# Patient Record
Sex: FEMALE | Race: OTHER | Employment: OTHER | URBAN - METROPOLITAN AREA
[De-identification: names, ages, dates, MRNs, and addresses within clinical notes are randomized per-mention and may not be internally consistent; named-entity substitution may affect disease eponyms.]

---

## 2020-06-04 ENCOUNTER — OFFICE VISIT (OUTPATIENT)
Dept: PRIMARY CARE CLINIC | Age: 70
End: 2020-06-04

## 2020-06-04 PROCEDURE — 99211 OFF/OP EST MAY X REQ PHY/QHP: CPT | Performed by: NURSE PRACTITIONER

## 2020-06-05 LAB
SARS-COV-2: NOT DETECTED
SOURCE: NORMAL

## 2022-08-31 ENCOUNTER — OFFICE VISIT (OUTPATIENT)
Dept: PULMONOLOGY | Age: 72
End: 2022-08-31
Payer: MEDICARE

## 2022-08-31 VITALS
SYSTOLIC BLOOD PRESSURE: 148 MMHG | BODY MASS INDEX: 29.37 KG/M2 | OXYGEN SATURATION: 98 % | HEIGHT: 64 IN | HEART RATE: 95 BPM | WEIGHT: 172 LBS | DIASTOLIC BLOOD PRESSURE: 90 MMHG

## 2022-08-31 DIAGNOSIS — J45.991 COUGH VARIANT ASTHMA: Primary | ICD-10-CM

## 2022-08-31 DIAGNOSIS — J47.9 BRONCHIECTASIS WITHOUT COMPLICATION (HCC): ICD-10-CM

## 2022-08-31 DIAGNOSIS — J30.1 SEASONAL ALLERGIC RHINITIS DUE TO POLLEN: ICD-10-CM

## 2022-08-31 DIAGNOSIS — R91.8 PULMONARY NODULES: ICD-10-CM

## 2022-08-31 PROCEDURE — 1123F ACP DISCUSS/DSCN MKR DOCD: CPT | Performed by: INTERNAL MEDICINE

## 2022-08-31 PROCEDURE — 99204 OFFICE O/P NEW MOD 45 MIN: CPT | Performed by: INTERNAL MEDICINE

## 2022-08-31 RX ORDER — MONTELUKAST SODIUM 10 MG/1
10 TABLET ORAL NIGHTLY
COMMUNITY
Start: 2021-10-06 | End: 2022-08-31 | Stop reason: SDUPTHER

## 2022-08-31 RX ORDER — ALBUTEROL SULFATE 90 UG/1
2 AEROSOL, METERED RESPIRATORY (INHALATION) EVERY 6 HOURS PRN
COMMUNITY
Start: 2022-02-09

## 2022-08-31 RX ORDER — HYDROCHLOROTHIAZIDE 12.5 MG/1
12.5 CAPSULE, GELATIN COATED ORAL DAILY
COMMUNITY
Start: 2022-07-11

## 2022-08-31 RX ORDER — ALENDRONATE SODIUM 70 MG/1
70 TABLET ORAL
COMMUNITY

## 2022-08-31 RX ORDER — MONTELUKAST SODIUM 10 MG/1
10 TABLET ORAL NIGHTLY
Qty: 30 TABLET | Refills: 3 | Status: SHIPPED | OUTPATIENT
Start: 2022-08-31 | End: 2022-11-29

## 2022-08-31 RX ORDER — CYANOCOBALAMIN (VITAMIN B-12) 1000 MCG
1 TABLET, EXTENDED RELEASE ORAL 2 TIMES DAILY WITH MEALS
COMMUNITY

## 2022-08-31 RX ORDER — OMEPRAZOLE 20 MG/1
20 CAPSULE, DELAYED RELEASE ORAL
COMMUNITY

## 2022-08-31 RX ORDER — AMLODIPINE BESYLATE 5 MG/1
5 TABLET ORAL EVERY EVENING
COMMUNITY
Start: 2022-07-11

## 2022-08-31 RX ORDER — CLONAZEPAM 0.5 MG/1
0.5 TABLET ORAL 2 TIMES DAILY PRN
COMMUNITY
Start: 2022-03-15

## 2022-08-31 RX ORDER — PRAVASTATIN SODIUM 20 MG
20 TABLET ORAL DAILY
COMMUNITY
Start: 2021-11-01

## 2022-08-31 RX ORDER — NAPROXEN 500 MG/1
500 TABLET ORAL PRN
COMMUNITY

## 2022-08-31 RX ORDER — FOLIC ACID 1 MG/1
1 TABLET ORAL 2 TIMES DAILY
COMMUNITY

## 2022-08-31 RX ORDER — KRILL/OM-3/DHA/EPA/PHOSPHO/AST 500-110 MG
1 CAPSULE ORAL
COMMUNITY

## 2022-08-31 RX ORDER — AZELASTINE 1 MG/ML
1 SPRAY, METERED NASAL 2 TIMES DAILY
COMMUNITY

## 2022-08-31 RX ORDER — MOMETASONE FUROATE AND FORMOTEROL FUMARATE DIHYDRATE 200; 5 UG/1; UG/1
2 AEROSOL RESPIRATORY (INHALATION) DAILY
COMMUNITY
Start: 2022-06-28

## 2022-08-31 NOTE — PROGRESS NOTES
PULMONARY OFFICE NEW PATIENT VISIT    CONSULTING PHYSICIAN:  Macho Camacho , Vanessa ref. provider found     REASON FOR VISIT:   Chief Complaint   Patient presents with    New Patient     Lung nodules     Asthma       DATE OF VISIT: 8/31/2022    HISTORY OF PRESENT ILLNESS: 67y.o. year old female comes into the pulmonary clinic for the first time. Patient has been diagnosed to have cough variant asthma since 2020. Simultaneously has also been suffering with postnasal drip, environmental allergies. Has been receiving treatment at the [facility].  Recently has plan to move all her medical care closer to home at [facility]. In the past was started on Dulera inhaler but developed thrush and for a few weeks could not use the inhaler. Once thrush was treated with oral fluconazole, eventually patient was able to restart [facility] and this time no thrush reoccurred. Has been using the inhaler regularly. Rarely uses albuterol. Also takes Astelin nasal spray along with Singulair. Also undergoing sublingual immunotherapy through ENT. Has been diagnosed to have bilateral bronchiectasis and currently using Acapella device. Takes methotrexate for psoriasis. REVIEW OF SYSTEMS:   CONSTITUTIONAL SYMPTOMS: The patient denies fever, fatigue, night sweats, weight loss or weight gain. HEENT: No vision changes. No tinnitus, Denies sinus pain. No hoarseness, or dysphagia. NECK: Patient denies swelling in the neck. CARDIOVASCULAR: Denies chest pain, palpitation, syncope. RESPIRATORY: As per HPI  GASTROINTESTINAL: Denies nausea, abdominal pain or change in bowel function. GENITOURINARY: Denies obstructive symptoms. No history of incontinence. BREASTS: No masses or lumps in the breasts. SKIN: No rashes or itching. MUSCULOSKELETAL: Denies weakness or bone pain. NEUROLOGICAL: No headaches or seizures. PSYCHIATRIC: Denies mood swings or depression.    ENDOCRINE: Denies heat or cold intolerance or excessive thirst.  HEMATOLOGIC/LYMPHATIC: Denies easy bruising or lymph node swelling. ALLERGIC/IMMUNOLOGIC: No environmental allergies. PAST MEDICAL HISTORY:   Past Medical History:   Diagnosis Date    Asthma     Hypertension     Lung nodule     Rheumatoid arthritis ([address]        PAST SURGICAL HISTORY: No past surgical history on file. SOCIAL HISTORY:   Social History     Tobacco Use    Smoking status: Never    Smokeless tobacco: Never   Substance Use Topics    Alcohol use: Not Currently    Drug use: Never       FAMILY HISTORY: No family history on file. MEDICATIONS:     Current Outpatient Medications on File Prior to Visit   Medication Sig Dispense Refill    albuterol sulfate HFA (PROVENTIL;VENTOLIN;PROAIR) 108 (90 Base) MCG/ACT inhaler Inhale 2 puffs into the lungs every 6 hours as needed      alendronate (FOSAMAX) 70 MG tablet Take 70 mg by mouth every 7 days      amLODIPine (NORVASC) 5 MG tablet Take 5 mg by mouth every evening      azelastine (ASTELIN) 0.1 % nasal spray 1 spray by Nasal route 2 times daily      diclofenac sodium (VOLTAREN) 1 % GEL Apply topically 4 times daily      DULERA 200-5 MCG/ACT inhaler 2 puffs daily      Krill Oil (OMEGA-3) 500 MG CAPS Take 1 capsule by mouth      methotrexate (RHEUMATREX) 2.5 MG chemo tablet Take by mouth      omeprazole (PRILOSEC) 20 MG delayed release capsule Take 20 mg by mouth      pravastatin (PRAVACHOL) 20 MG tablet Take 20 mg by mouth daily      naproxen (NAPROSYN) 500 MG tablet Take 500 mg by mouth as needed      folic acid (FOLVITE) 1 MG tablet Take 1 mg by mouth 2 times daily      hydroCHLOROthiazide (MICROZIDE) 12.5 MG capsule Take 12.5 mg by mouth daily      clonazePAM (KLONOPIN) 0.5 MG tablet Take 0.5 mg by mouth 2 times daily as needed.       calcium citrate-vitamin D (CITRICAL + D) 315-250 MG-UNIT TABS per tablet Take 1 tablet by mouth 2 times daily (with meals)      LEUCOVORIN CALCIUM PO Take 2 tablets by mouth once a week       No current facility-administered medications on file prior to visit. ALLERGIES:   Allergies as of 08/31/2022 - Fully Reviewed 08/31/2022   Allergen Reaction Noted    Erythromycin Nausea And Vomiting and Rash 01/25/2011    Penicillins Rash 11/21/2013      OBJECTIVE:   height is 5' 4\" (1.626 m) and weight is 172 lb (78 kg). Her blood pressure is 148/90 (abnormal) and her pulse is 95. Her oxygen saturation is 98%. PHYSICAL EXAM:    CONSTITUTIONAL: She is a 67y.o.-year-old who appears her stated age. She is alert and oriented x 3 and in no acute distress. HEENT: PERRLA, EOMI. No scleral icterus. No thrush, atraumatic, normocephalic. NECK: Supple, without cervical or supraclavicular lymphadenopathy:  CARDIOVASCULAR: S1 S2 RRR. Without murmer  RESPIRATORY & CHEST: Lungs are clear to auscultation and percussion. No wheezing, no crackles. Good air movement  GASTROINTESTINAL & ABDOMEN: Soft, nontender, positive bowel sounds in all quadrants, non-distended, without hepatosplenomegaly. GENITOURINARY: Deferred. MUSCULOSKELETAL: No tenderness to palpation of the axial skeleton. There is no clubbing. No cyanosis. No edema of the lower extremities. SKIN OF BODY: No rash or jaundice. PSYCHIATRIC EVALUATION: Normal affect. Patient answers questions appropriately. HEMATOLOGIC/LYMPHATIC/ IMMUNOLOGIC: No palpable lymphadenopathy. NEUROLOGIC: Alert and oriented x 3. Groslly non-focal. Motor strength is 5+/5 in all muscle groups. The patient has a normal sensorium globally. LABS:    No flowsheet data found. IMAGING:    CT chest without contrast done on 7/22/2022 at the [facility]    Findings:  Lungs and pleura: Mild apical subpleural fibrosis. There is read demonstration of a bilobed or pleural effusion nodules in the superior segment of the right lower lobe.   Measure to greater than nodules, and measures 7 x 5 mm, previously measured seen level and in the same fashion then measured seen on August 14, 2020. Nodules appear slightly more confluent and are not clearly separable as a result are measured together on the current exam.  There is mild mucous plugging with branching linear and nodular opacity in the lateral segment of the middle lobe compatible with small focus of bronchiolitis. Tiny nodule in the superior segment left lower lobe images remained stable. Additional mucous plugging identified in the left lower lobe. No pleural effusion. Normal pulmonary nodule otherwise identified. Normal heart size. No pericardial effusion, no mediastinal lymphadenopathy, no hilar lymphadenopathy.,  No axillary lymphadenopathy, no supraclavicular lymphadenopathy. Multiple hepatic cysts not appreciably changed in comparison to the prior exam.  No acute upper abdominal abnormality. Pulmonary Functions Testing Results:    Complete PFT done on 9/21/2020 at [facility] is diminished suggestive of possible restrictive defect. BRONCHIAL CHALLENGE TEST   Challenge Test is a positive result. The PD 20 is 16 mg/ml. Electronically Signed On 9- 21:08:45 EDT by Lorena Green MD      1. Cough variant asthma/small airways disease-- noted + methacholine challenge-- well controlled now  2. Pulmonary infiltrates RLL tree in bud 1/29/20- suspected bacterial PNA-- s/p omnicef/doxycycline-- resolved  3. BLL bronchiectasis- mild  4. RLL 5 x 3 mm lung nodule- stable since 10/2019  5. RLL 2-3 mm lung nodule- stable since 10/2019  6. Severe thrush- failed multiple treatments  7. Hx sinusitis with B osteomeatal obstruction  8. Post nasal drainage- resolved  9. RA- on MTX  10. Psoriasis- resolved s/p tar treatments  11. Allergies- on sl immunotherapy  12. Osteoporosis- on fosamax  13. Anxiety  14. HPL  15. GERD- on prilosec PRN  16. Hx BCG administration in [place] as child  16. Normal IgE and eosinophil level  18. Normal immunoglobulins              Plan:   1.  Off dulera 200/5 mcg 2p BID d/t thrush  2. Cont singulair 10 mg qhs  3. Albuterol HFA/HHN PRN-- not needed to use  4. Cont astelin NS, saline rinses per ENT  5. Cont acapella QID  6. PEF ~ 330 ml  7. Cont tessalon PRN  8. Cont saline NS daily  9. Cont immunotherapy per ENT  10. Cont pulm rehab @ [place]  11. CT chest 5/2022   12. Sees Dr Francisco Duong with ID tomorrow to address persistent thrush  13. RTC 8 months          IMPRESSION AND RECOMMENDATIONS:     1. Cough variant asthma  -Patient has been diagnosed to have cough variant asthma based on her episodic symptoms as well as positive methacholine challenge test.  -Continue with Dulera at 200/5 mcg 2 puff twice daily. As her symptoms stabilize, should be able to downgrade her to lower dose of Dulera. -Use albuterol as needed.  -I advised the patient to use both these inhalers with a spacer chamber to reduce the risk of recurrence of thrush.  -Also using Acapella device bilateral bronchiectasis. 2. Pulmonary nodules  -Patient has had both right lower lobe as well as left lower lobe subcentimeter pulm nodules which have been present since 2019.  -She is a lifelong non-smoker with no previous history of cancers.  -High risk for lung malignancy is low.  -At this time I do not see any indication for repeat chest imaging. 3. Seasonal allergic rhinitis due to pollen  -Continue taking montelukast and Astelin nasal spray. Also undergoing sublingual immunotherapy via ENT. 4. Bronchiectasis without complication (HCC)  -Currently not in exacerbation.  -She will use Acapella device regularly in order to help her expectorate. -Advised the patient to use albuterol first thing in the morning and then try Acapella device in order to help her expectorate more effectively. - Acapella; Future    Thank you  Dr. Terrie Watts for letting me take care of this very pleasant patient. Return in about 6 months (around 2/28/2023) for Asthma.          Jerry Henry MD  Pulmonary Critical Care and Sleep Medicine  8/31/2022, 12:34 PM    This note was completed using dragon medical speech recognition software. Grammatical errors, random word insertions, pronoun errors and incomplete sentences are occasional consequences of this technology due to software limitations. If there are questions or concerns about the content of this note of information contained within the body of this dictation they should be addressed with the provider for clarification.

## 2022-09-14 ENCOUNTER — TELEPHONE (OUTPATIENT)
Dept: PULMONOLOGY | Age: 72
End: 2022-09-14

## 2022-09-14 NOTE — TELEPHONE ENCOUNTER
Pt called and left voice mail saying her cough has not gotten any better and would like to discuss.      # [phone]

## 2022-09-14 NOTE — TELEPHONE ENCOUNTER
Spoke with patient. She states her cough starts to get worse in the afternoon. Recommended to pt that she can take the Albuterol every 6 hours as needed. She is taking the Dulera, Singulair and Flonase daily. She is out of the Astelin NS. I advised pt to get Astelin refilled and start using it again.

## 2022-11-30 ENCOUNTER — OFFICE VISIT (OUTPATIENT)
Dept: PULMONOLOGY | Age: 72
End: 2022-11-30
Payer: MEDICARE

## 2022-11-30 VITALS
DIASTOLIC BLOOD PRESSURE: 86 MMHG | WEIGHT: 166.4 LBS | SYSTOLIC BLOOD PRESSURE: 138 MMHG | OXYGEN SATURATION: 98 % | BODY MASS INDEX: 28.41 KG/M2 | HEART RATE: 106 BPM | HEIGHT: 64 IN

## 2022-11-30 DIAGNOSIS — J47.9 BRONCHIECTASIS WITHOUT COMPLICATION (HCC): ICD-10-CM

## 2022-11-30 DIAGNOSIS — R91.8 PULMONARY NODULES: ICD-10-CM

## 2022-11-30 DIAGNOSIS — J30.1 NON-SEASONAL ALLERGIC RHINITIS DUE TO POLLEN: ICD-10-CM

## 2022-11-30 DIAGNOSIS — J45.991 COUGH VARIANT ASTHMA: Primary | ICD-10-CM

## 2022-11-30 PROCEDURE — 99214 OFFICE O/P EST MOD 30 MIN: CPT | Performed by: INTERNAL MEDICINE

## 2022-11-30 PROCEDURE — 1123F ACP DISCUSS/DSCN MKR DOCD: CPT | Performed by: INTERNAL MEDICINE

## 2022-11-30 RX ORDER — GUAIFENESIN 600 MG/1
600 TABLET, EXTENDED RELEASE ORAL 2 TIMES DAILY
Qty: 10 TABLET | Refills: 0 | Status: SHIPPED | OUTPATIENT
Start: 2022-11-30 | End: 2022-12-05

## 2022-11-30 RX ORDER — MOMETASONE FUROATE AND FORMOTEROL FUMARATE DIHYDRATE 200; 5 UG/1; UG/1
2 AEROSOL RESPIRATORY (INHALATION) 2 TIMES DAILY
Qty: 13 G | Refills: 5 | Status: SHIPPED | OUTPATIENT
Start: 2022-11-30

## 2022-11-30 RX ORDER — ALBUTEROL SULFATE 90 UG/1
2 AEROSOL, METERED RESPIRATORY (INHALATION) EVERY 6 HOURS PRN
Qty: 18 G | Refills: 5 | Status: SHIPPED | OUTPATIENT
Start: 2022-11-30

## 2022-11-30 RX ORDER — MONTELUKAST SODIUM 10 MG/1
10 TABLET ORAL NIGHTLY
Qty: 90 TABLET | Refills: 3 | Status: SHIPPED | OUTPATIENT
Start: 2022-11-30 | End: 2023-11-25

## 2022-11-30 NOTE — PROGRESS NOTES
PULMONARY OFFICE FOLLOW-UP VISIT    CONSULTING PHYSICIAN:  Natalya Joy    REASON FOR VISIT:   Chief Complaint   Patient presents with    Cough         DATE OF VISIT: 11/30/2022    HISTORY OF PRESENT ILLNESS: 67y.o. year old female comes into the pulmonary clinic for a follow-up. Patient reports that her breathing is about the same. She has been under a lot of stress as her  is currently in nursing home due to delirium. This has taken a toll on her health as she feels severely anxious. Anxiety leads to increased shortness of breath and occasionally cough. She reports the cough is very situational and does happen in high stress environment. She has been finding it tough to expectorate. Has been using Acapella device regularly. Also using Dulera and albuterol regularly. Currently using Flonase, Astelin nasal spray and Singulair therapy 2. Weight remains stable. Previously:. Patient has been diagnosed to have cough variant asthma since 2020. Simultaneously has also been suffering with postnasal drip, environmental allergies. Has been receiving treatment at the [facility].  Recently has plan to move all her medical care closer to home at [facility]. In the past was started on Dulera inhaler but developed thrush and for a few weeks could not use the inhaler. Once thrush was treated with oral fluconazole, eventually patient was able to restart [facility] and this time no thrush reoccurred. Has been using the inhaler regularly. Rarely uses albuterol. Also takes Astelin nasal spray along with Singulair. Also undergoing sublingual immunotherapy through ENT. Has been diagnosed to have bilateral bronchiectasis and currently using Acapella device. Takes methotrexate for psoriasis. REVIEW OF SYSTEMS:   8 point review of system is negative except that mentioned in the HPI.     PAST MEDICAL HISTORY:   Past Medical History:   Diagnosis Date    Asthma     Hypertension     Lung nodule     Rheumatoid arthritis ([facility] [address]        PAST SURGICAL HISTORY: No past surgical history on file. SOCIAL HISTORY:   Social History     Tobacco Use    Smoking status: Never    Smokeless tobacco: Never   Substance Use Topics    Alcohol use: Not Currently    Drug use: Never       FAMILY HISTORY: No family history on file. MEDICATIONS:     Current Outpatient Medications on File Prior to Visit   Medication Sig Dispense Refill    alendronate (FOSAMAX) 70 MG tablet Take 70 mg by mouth every 7 days      amLODIPine (NORVASC) 5 MG tablet Take 5 mg by mouth every evening      azelastine (ASTELIN) 0.1 % nasal spray 1 spray by Nasal route 2 times daily      diclofenac sodium (VOLTAREN) 1 % GEL Apply topically 4 times daily      Krill Oil (OMEGA-3) 500 MG CAPS Take 1 capsule by mouth      methotrexate (RHEUMATREX) 2.5 MG chemo tablet Take by mouth      omeprazole (PRILOSEC) 20 MG delayed release capsule Take 20 mg by mouth      pravastatin (PRAVACHOL) 20 MG tablet Take 20 mg by mouth daily      naproxen (NAPROSYN) 500 MG tablet Take 500 mg by mouth as needed      folic acid (FOLVITE) 1 MG tablet Take 1 mg by mouth 2 times daily      hydroCHLOROthiazide (MICROZIDE) 12.5 MG capsule Take 12.5 mg by mouth daily      clonazePAM (KLONOPIN) 0.5 MG tablet Take 0.5 mg by mouth 2 times daily as needed. calcium citrate-vitamin D (CITRICAL + D) 315-250 MG-UNIT TABS per tablet Take 1 tablet by mouth 2 times daily (with meals)      LEUCOVORIN CALCIUM PO Take 2 tablets by mouth once a week      Spacer/Aero-Holding Chambers JOSELYN 1 Device by Does not apply route daily as needed (SHORTNESS OF BREATH) 1 each 0     No current facility-administered medications on file prior to visit.                ALLERGIES:   Allergies as of 11/30/2022 - Fully Reviewed 11/30/2022   Allergen Reaction Noted    Erythromycin Nausea And Vomiting and Rash 01/25/2011    Penicillins Rash 11/21/2013      OBJECTIVE:   height is 5' 4\" (1.626 m) and weight is 166 lb 6.4 oz (75.5 kg). Her blood pressure is 138/86 and her pulse is 106 (abnormal). Her oxygen saturation is 98%. PHYSICAL EXAM:    CONSTITUTIONAL: She is a 67y.o.-year-old who appears her stated age. She is alert and oriented x 3 and in no acute distress. HEENT: PERRLA, EOMI. No scleral icterus. No thrush, atraumatic, normocephalic. NECK: Supple, without cervical or supraclavicular lymphadenopathy:  CARDIOVASCULAR: S1 S2 RRR. Without murmer  RESPIRATORY & CHEST: Bilateral air entry is equal.  No wheezing or crackles heard. GASTROINTESTINAL & ABDOMEN: Soft, nontender, positive bowel sounds in all quadrants, non-distended, without hepatosplenomegaly. GENITOURINARY: Deferred. MUSCULOSKELETAL: No tenderness to palpation of the axial skeleton. There is no clubbing. No cyanosis. No edema of the lower extremities. SKIN OF BODY: No rash or jaundice. PSYCHIATRIC EVALUATION: Normal affect. Patient answers questions appropriately. HEMATOLOGIC/LYMPHATIC/ IMMUNOLOGIC: No palpable lymphadenopathy. NEUROLOGIC: Alert and oriented x 3. Groslly non-focal. Motor strength is 5+/5 in all muscle groups. The patient has a normal sensorium globally. LABS:    No flowsheet data found. IMAGING:    CT chest without contrast done on 7/22/2022 at [facility]    Findings:  Lungs and pleura: Mild apical subpleural fibrosis. There is read demonstration of a bilobed or pleural effusion nodules in the superior segment of the right lower lobe. Measure to greater than nodules, and measures 7 x 5 mm, previously measured seen level and in the same fashion then measured seen on August 14, 2020. Nodules appear slightly more confluent and are not clearly separable as a result are measured together on the current exam.  There is mild mucous plugging with branching linear and nodular opacity in the lateral segment of the middle lobe compatible with small focus of bronchiolitis.   Tiny nodule in the superior segment left lower lobe images remained stable. Additional mucous plugging identified in the left lower lobe. No pleural effusion. Normal pulmonary nodule otherwise identified. Normal heart size. No pericardial effusion, no mediastinal lymphadenopathy, no hilar lymphadenopathy.,  No axillary lymphadenopathy, no supraclavicular lymphadenopathy. Multiple hepatic cysts not appreciably changed in comparison to the prior exam.  No acute upper abdominal abnormality. Pulmonary Functions Testing Results:    Complete PFT done on 9/21/2020 at [facility] is diminished suggestive of possible restrictive defect. BRONCHIAL CHALLENGE TEST   Challenge Test is a positive result. The PD 20 is 16 mg/ml. Electronically Signed On 9- 21:08:45 EDT by Naomi Cabrera MD            IMPRESSION AND RECOMMENDATIONS:     1. Cough variant asthma  -Patient has been diagnosed to have cough variant asthma based on her episodic symptoms as well as positive methacholine challenge test.  -Continue with Dulera at 200/5 mcg 2 puff twice daily. As of now her symptoms remain persistent and I will continue with high-dose Dulera. No indications for adding additional steroid inhaler.  -Use albuterol as needed.  -I advised the patient to use both these inhalers with a spacer chamber to reduce the risk of recurrence of thrush.  -Also using Acapella device bilateral bronchiectasis. 2. Pulmonary nodules  -Patient has had both right lower lobe as well as left lower lobe subcentimeter pulm nodules which have been present since 2019.  -She is a lifelong non-smoker with no previous history of cancers.  -Her risk for lung malignancy is low.  -At this time I do not see any indication for repeat chest imaging. 3. Seasonal allergic rhinitis due to pollen  -Continue taking montelukast and Astelin nasal spray.  -Also undergoing sublingual immunotherapy via ENT. -She will follow-up with ENT in the near future.     4. Bronchiectasis without complication (HCC)  -Currently not in exacerbation.  -She will use Acapella device regularly in order to help her expectorate. -Advised the patient to use albuterol first thing in the morning and then try Acapella device in order to help her expectorate more effectively. -I will give her a 5-day course of Mucinex 600 mg twice daily. Thank you  Dr. Valentino Verduzco for letting me take care of this very pleasant patient. Return in about 6 months (around 5/30/2023). Nanette Collier MD  Pulmonary Critical Care and Sleep Medicine  11/30/2022, 11:25 AM    This note was completed using dragon medical speech recognition software. Grammatical errors, random word insertions, pronoun errors and incomplete sentences are occasional consequences of this technology due to software limitations. If there are questions or concerns about the content of this note of information contained within the body of this dictation they should be addressed with the provider for clarification.

## 2023-01-03 ENCOUNTER — TELEPHONE (OUTPATIENT)
Dept: PULMONOLOGY | Age: 73
End: 2023-01-03

## 2023-01-03 RX ORDER — MOMETASONE FUROATE AND FORMOTEROL FUMARATE DIHYDRATE 200; 5 UG/1; UG/1
2 AEROSOL RESPIRATORY (INHALATION) 2 TIMES DAILY
Qty: 13 G | Refills: 5 | Status: CANCELLED | OUTPATIENT
Start: 2023-01-03

## 2023-01-03 NOTE — TELEPHONE ENCOUNTER
Patient called and needs a prescription refilled       [place] 200-5 MCG/ACT inhaler     [facility]  [address] [place] [address]  [phone]

## 2023-01-04 RX ORDER — MOMETASONE FUROATE AND FORMOTEROL FUMARATE DIHYDRATE 200; 5 UG/1; UG/1
2 AEROSOL RESPIRATORY (INHALATION) 2 TIMES DAILY
Qty: 13 G | Refills: 5 | Status: SHIPPED | OUTPATIENT
Start: 2023-01-04

## 2023-02-28 ENCOUNTER — OFFICE VISIT (OUTPATIENT)
Dept: PULMONOLOGY | Age: 73
End: 2023-02-28
Payer: MEDICARE

## 2023-02-28 ENCOUNTER — HOSPITAL ENCOUNTER (OUTPATIENT)
Age: 73
Discharge: HOME | End: 2023-02-28
Payer: MEDICARE

## 2023-02-28 VITALS
OXYGEN SATURATION: 97 % | BODY MASS INDEX: 28 KG/M2 | SYSTOLIC BLOOD PRESSURE: 139 MMHG | TEMPERATURE: 98.9 F | WEIGHT: 164 LBS | DIASTOLIC BLOOD PRESSURE: 95 MMHG | HEIGHT: 64 IN | RESPIRATION RATE: 16 BRPM | HEART RATE: 87 BPM

## 2023-02-28 DIAGNOSIS — J47.9 BRONCHIECTASIS WITHOUT COMPLICATION (HCC): ICD-10-CM

## 2023-02-28 DIAGNOSIS — J82.83 EOSINOPHILIC ASTHMA: ICD-10-CM

## 2023-02-28 DIAGNOSIS — J45.909 SEVERE ASTHMA WITHOUT COMPLICATION, UNSPECIFIED WHETHER PERSISTENT: ICD-10-CM

## 2023-02-28 DIAGNOSIS — R91.8 PULMONARY NODULES: ICD-10-CM

## 2023-02-28 DIAGNOSIS — J45.991 COUGH VARIANT ASTHMA: Primary | ICD-10-CM

## 2023-02-28 DIAGNOSIS — J45.991 COUGH VARIANT ASTHMA: ICD-10-CM

## 2023-02-28 DIAGNOSIS — J30.1 NON-SEASONAL ALLERGIC RHINITIS DUE TO POLLEN: ICD-10-CM

## 2023-02-28 DIAGNOSIS — M06.9 RHEUMATOID ARTHRITIS, INVOLVING UNSPECIFIED SITE, UNSPECIFIED WHETHER RHEUMATOID FACTOR PRESENT (HCC): ICD-10-CM

## 2023-02-28 LAB
BASOPHILS ABSOLUTE: 0.1 K/UL (ref 0–0.2)
BASOPHILS RELATIVE PERCENT: 0.8 %
EOSINOPHILS ABSOLUTE: 0.1 K/UL (ref 0–0.6)
EOSINOPHILS RELATIVE PERCENT: 1 %
FENO: 27 PPB
HCT VFR BLD CALC: 45.4 % (ref 36–48)
HEMOGLOBIN: 14.5 G/DL (ref 12–16)
IGA: 271 MG/DL (ref 70–400)
IGG: 1080 MG/DL (ref 700–1600)
IGM: 105 MG/DL (ref 40–230)
LYMPHOCYTES ABSOLUTE: 2.5 K/UL (ref 1–5.1)
LYMPHOCYTES RELATIVE PERCENT: 22.4 %
MCH RBC QN AUTO: 29.8 PG (ref 26–34)
MCHC RBC AUTO-ENTMCNC: 32 G/DL (ref 31–36)
MCV RBC AUTO: 93.3 FL (ref 80–100)
MONOCYTES ABSOLUTE: 0.6 K/UL (ref 0–1.3)
MONOCYTES RELATIVE PERCENT: 5.5 %
NEUTROPHILS ABSOLUTE: 7.9 K/UL (ref 1.7–7.7)
NEUTROPHILS RELATIVE PERCENT: 70.3 %
PDW BLD-RTO: 16.2 % (ref 12.4–15.4)
PLATELET # BLD: 278 K/UL (ref 135–450)
PMV BLD AUTO: 8.6 FL (ref 5–10.5)
RBC # BLD: 4.87 M/UL (ref 4–5.2)
WBC # BLD: 11.2 K/UL (ref 4–11)

## 2023-02-28 PROCEDURE — 82785 ASSAY OF IGE: CPT

## 2023-02-28 PROCEDURE — 95012 NITRIC OXIDE EXP GAS DETER: CPT | Performed by: INTERNAL MEDICINE

## 2023-02-28 PROCEDURE — 82787 IGG 1 2 3 OR 4 EACH: CPT

## 2023-02-28 PROCEDURE — 1123F ACP DISCUSS/DSCN MKR DOCD: CPT | Performed by: INTERNAL MEDICINE

## 2023-02-28 PROCEDURE — 94664 DEMO&/EVAL PT USE INHALER: CPT | Performed by: INTERNAL MEDICINE

## 2023-02-28 PROCEDURE — 85025 COMPLETE CBC W/AUTO DIFF WBC: CPT

## 2023-02-28 PROCEDURE — 82784 ASSAY IGA/IGD/IGG/IGM EACH: CPT

## 2023-02-28 PROCEDURE — 36415 COLL VENOUS BLD VENIPUNCTURE: CPT

## 2023-02-28 PROCEDURE — 86003 ALLG SPEC IGE CRUDE XTRC EA: CPT

## 2023-02-28 PROCEDURE — 99214 OFFICE O/P EST MOD 30 MIN: CPT | Performed by: INTERNAL MEDICINE

## 2023-02-28 RX ORDER — HYDROCHLOROTHIAZIDE 12.5 MG/1
12.5 CAPSULE, GELATIN COATED ORAL DAILY
COMMUNITY

## 2023-02-28 RX ORDER — LOSARTAN POTASSIUM 25 MG/1
25 TABLET ORAL DAILY
COMMUNITY

## 2023-02-28 RX ORDER — FLUTICASONE FUROATE, UMECLIDINIUM BROMIDE AND VILANTEROL TRIFENATATE 200; 62.5; 25 UG/1; UG/1; UG/1
1 POWDER RESPIRATORY (INHALATION) DAILY
Qty: 1 EACH | Refills: 0 | COMMUNITY
Start: 2023-02-28

## 2023-02-28 ASSESSMENT — ENCOUNTER SYMPTOMS
SHORTNESS OF BREATH: 0
RHINORRHEA: 0
EYES NEGATIVE: 1
SORE THROAT: 0
ALLERGIC/IMMUNOLOGIC NEGATIVE: 1
WHEEZING: 0
HEMOPTYSIS: 0
GASTROINTESTINAL NEGATIVE: 1
HEARTBURN: 0
COUGH: 1

## 2023-02-28 ASSESSMENT — PULMONARY FUNCTION TESTS: FENO: 27

## 2023-02-28 NOTE — PROGRESS NOTES
MA Communication:   The following orders are received by verbal communication from Shoshana De La Torre MD    Orders include:  Pt to get labwork       8 wk fu scheduled 4/24/23

## 2023-02-28 NOTE — PROGRESS NOTES
Melissa Goetz (: 1950 ) is a 67 y.o. female here for an evaluation of   Chief Complaint   Patient presents with    Shortness of Breath    Cough           ASSESSMENT/PLAN:   Diagnosis Orders   1. Cough variant asthma  POCT Nitric Oxide      2. Pulmonary nodules  POCT Nitric Oxide      3. Non-seasonal allergic rhinitis due to pollen  POCT Nitric Oxide      4. Bronchiectasis without complication ([address]  POCT Nitric Oxide      5. Eosinophilic asthma        6. Severe asthma without complication, unspecified whether persistent        7. Rheumatoid arthritis, involving unspecified site, unspecified whether rheumatoid factor present ([address]            Cough variant asthma  More consistent with the following  Eosinophilic asthma/severe asthma  Complete PFT done on 2020 at [facility] is diminished suggestive of possible restrictive defect. BRONCHIAL CHALLENGE TEST   Challenge Test is a positive result. The PD 20 is 16 mg/ml. Electronically Signed On 2020 21:08:45 EDT by Hunter Judge MD    Continue with  Milka Pound 200/5 doing 2 puffs twice daily- will stop this now  Albuterol as needed      Last eosinophils 2022-was 300    Overall this biggest issue is the cough and mucus production  Continues despite all the medications the patient has been on    FENO done 23  Was 27    Options at this time  1. Bronchoscopy to evaluate for infectious disease  2. Evaluation of the immune system, eosinophil levels and IgE and RAST testing  3. Mucinex  4. Trelegy 200      Will stop the dulera  And start tomorrow morning on  Trelegy 200 use 1 puff a day- showed how to use    Call me in 2 weeks about inhaler      Ok to use  Mucinex daily either twice a day or in the morning      Will get option 2 work up    If  the above doesn't work, will do bronchscopy        Pulmonary nodules  Lifetime non-smoker    CT scan done 2022  COMPARISON: 2021.      Impression    IMPRESSION:     1. Stable indeterminate pulmonary nodules. Follow-up CT in one year recommended. No new pulmonary nodule identified. 2. Mucous plugging and branching linear irregular opacities in the lateral segment of the middle lobe and in the left lower lobe compatible with bronchiolitis. Correlate clinically for chronic bronchiolitis. .       May need to have repeat CT scan in July 2023 as the patient does have a history of rheumatoid arthritis also    Allergic rhinitis  Continue with  Singulair/montelukast  Astelin nasal spray-stopped b/c didn't help  Sublingual immunotherapy via ENT  Saline nebulizer twice a day        Bronchiectasis  Seen on CT scan  CT scan done 7/22/2022  Mucous plugging and branching linear irregular opacities in the lateral segment of the middle lobe and in the left lower lobe compatible with bronchiolitis. Correlate clinically for chronic bronchiolitis. .     May benefit from vest therapy  Has tried the following  Incentive spirometry  Flutter valve  Percussion        Rheumatoid Arthritis  Was on Xelgans in the past  Now on  MTX    May need to have periodic PFTs to evaluate methotrexate effect on lungs        RTC in 8-10 weeks. No follow-ups on file. I have personally reviewed and summarized the old records and/or obtained further history from someone other than the patient. I have reviewed the lab tests, radiology/sleep reports and medications    I have downloaded and interpreted the cpap/bipap/pap data. I have made adjustments as described    Reviewed present meds and side effects. Continue present meds. Stay compliant. Call if worsens. Reviewed proper inhaler usage            SUBJECTIVE/OBJECTIVE:    Transfer of care from Dr. Deangelo Byrd to me  Last seen by pulmonary 11/30/2022  Initially seen by me on 2/28/2023        From Dr. Deangelo Byrd note on 11/30/22  DATE OF VISIT: 11/30/2022     HISTORY OF PRESENT ILLNESS: 67y.o. year old female comes into the pulmonary clinic for a follow-up.   Patient reports that her breathing is about the same.  She has been under a lot of stress as her  is currently in nursing home due to delirium.  This has taken a toll on her health as she feels severely anxious.  Anxiety leads to increased shortness of breath and occasionally cough.  She reports the cough is very situational and does happen in high stress environment.  She has been finding it tough to expectorate.  Has been using Acapella device regularly.  Also using Dulera and albuterol regularly.  Currently using Flonase, Astelin nasal spray and Singulair therapy 2.  Weight remains stable.     Previously:.  Patient has been diagnosed to have cough variant asthma since 2020.  Simultaneously has also been suffering with postnasal drip, environmental allergies.  Has been receiving treatment at the [facility].  Recently has plan to move all her medical care closer to home at [facility].  In the past was started on Dulera inhaler but developed thrush and for a few weeks could not use the inhaler.  Once thrush was treated with oral fluconazole, eventually patient was able to restart Dulera and this time no thrush reoccurred.  Has been using the inhaler regularly.  Rarely uses albuterol.  Also takes Astelin nasal spray along with Singulair.  Also undergoing sublingual immunotherapy through ENT.  Has been diagnosed to have bilateral bronchiectasis and currently using Acapella device.  Takes methotrexate for psoriasis.        IMPRESSION AND RECOMMENDATIONS:      1. Cough variant asthma  -Patient has been diagnosed to have cough variant asthma based on her episodic symptoms as well as positive methacholine challenge test.  -Continue with Dulera at 200/5 mcg 2 puff twice daily.  As of now her symptoms remain persistent and I will continue with high-dose Dulera.  No indications for adding additional steroid inhaler.  -Use albuterol as needed.  -I advised the patient to use both these inhalers with a spacer chamber to reduce  the risk of recurrence of thrush.  -Also using Acapella device bilateral bronchiectasis.     2. Pulmonary nodules  -Patient has had both right lower lobe as well as left lower lobe subcentimeter pulm nodules which have been present since 2019.  -She is a lifelong non-smoker with no previous history of cancers.  -Her risk for lung malignancy is low.  -At this time I do not see any indication for repeat chest imaging.     3. Seasonal allergic rhinitis due to pollen  -Continue taking montelukast and Astelin nasal spray.  -Also undergoing sublingual immunotherapy via ENT.  -She will follow-up with ENT in the near future.     4. Bronchiectasis without complication (HCC)  -Currently not in exacerbation.  -She will use Acapella device regularly in order to help her expectorate.  -Advised the patient to use albuterol first thing in the morning and then try Acapella device in order to help her expectorate more effectively.  -I will give her a 5-day course of Mucinex 600 mg twice daily.                Since last visit  Most of the issues is the cough of mucus  Light yellow  And no blood    Overall the biggest issue is the mucus production  Despite the multiple rounds of inhalers and immunotherapy with ENT she still has cough and mucus production  She has had issues with thrush because of the steroid intake that she does  She is never been on a consistent steroid via oral  Is never required steroid oral steroid       Cough  This is a chronic problem. The current episode started more than 1 year ago. The problem has been waxing and waning. The cough is Productive of sputum. Pertinent negatives include no chest pain, chills, ear congestion, ear pain, fever, headaches, heartburn, hemoptysis, myalgias, nasal congestion, postnasal drip, rash, rhinorrhea, sore throat, shortness of breath, sweats, weight loss or wheezing.       Review of Systems   Constitutional: Negative.  Negative for chills, fever and weight loss.   HENT:  Negative. Negative for ear pain, postnasal drip, rhinorrhea and sore throat. Eyes: Negative. Respiratory:  Positive for cough. Negative for hemoptysis, shortness of breath and wheezing. Cardiovascular: Negative. Negative for chest pain. Gastrointestinal: Negative. Negative for heartburn. Endocrine: Negative. Genitourinary: Negative. Musculoskeletal: Negative. Negative for myalgias. Skin: Negative. Negative for rash. Allergic/Immunologic: Negative. Neurological: Negative. Negative for headaches. Hematological: Negative. Psychiatric/Behavioral: Negative. Vitals:    02/28/23 1144 02/28/23 1159   BP: (!) 145/88 (!) 139/95   Site: Left Upper Arm Right Upper Arm   Position: Sitting Sitting   Cuff Size: Medium Adult Medium Adult   Pulse: 87    Resp: 16    Temp: 98.9 °F (37.2 °C)    TempSrc: Temporal    SpO2: 97%    Weight: 164 lb (74.4 kg)    Height: 5' 4\" (1.626 m)         Physical Exam  Vitals and nursing note reviewed. Constitutional:       General: She is not in acute distress. Appearance: Normal appearance. She is not ill-appearing. HENT:      Head: Normocephalic and atraumatic. Right Ear: External ear normal.      Left Ear: External ear normal.      Nose: Nose normal.      Mouth/Throat:      Mouth: Mucous membranes are moist.      Pharynx: Oropharynx is clear. Comments: Mallampati 2  Eyes:      General: No scleral icterus. Extraocular Movements: Extraocular movements intact. Conjunctiva/sclera: Conjunctivae normal.      Pupils: Pupils are equal, round, and reactive to light. Cardiovascular:      Rate and Rhythm: Normal rate and regular rhythm. Pulses: Normal pulses. Heart sounds: Normal heart sounds. No murmur heard. No friction rub. Pulmonary:      Effort: No respiratory distress. Breath sounds: No stridor. Wheezing present. No rhonchi or rales. Chest:      Chest wall: No tenderness.    Abdominal:      General: Abdomen is flat. Bowel sounds are normal. There is no distension. Tenderness: There is no abdominal tenderness. There is no guarding. Musculoskeletal:         General: No swelling or tenderness. Normal range of motion. Cervical back: Normal range of motion and neck supple. No rigidity. Skin:     General: Skin is warm and dry. Coloration: Skin is not jaundiced. Neurological:      General: No focal deficit present. Mental Status: She is alert and oriented to person, place, and time. Mental status is at baseline. Cranial Nerves: No cranial nerve deficit. Sensory: No sensory deficit. Motor: No weakness. Gait: Gait normal.   Psychiatric:         Mood and Affect: Mood normal.         Thought Content:  Thought content normal.         Judgment: Judgment normal.            Phill Patino MD

## 2023-02-28 NOTE — LETTER
February 28, 2023       Leonel Mendoza YOB: 1950   [address] [address] Date of Visit:  2/28/2023 2/28/23        Leonel Mendoza      I have seen this patient in the office today and wanted to communicate my findings and recommendations. Patient Instructions         ASSESSMENT/PLAN:   Diagnosis Orders   1. Cough variant asthma  POCT Nitric Oxide      2. Pulmonary nodules  POCT Nitric Oxide      3. Non-seasonal allergic rhinitis due to pollen  POCT Nitric Oxide      4. Bronchiectasis without complication ([address]  POCT Nitric Oxide      5. Eosinophilic asthma        6. Severe asthma without complication, unspecified whether persistent        7. Rheumatoid arthritis, involving unspecified site, unspecified whether rheumatoid factor present ([address]            Cough variant asthma  More consistent with the following  Eosinophilic asthma/severe asthma  Complete PFT done on 9/21/2020 at [facility] is diminished suggestive of possible restrictive defect. BRONCHIAL CHALLENGE TEST   Challenge Test is a positive result. The PD 20 is 16 mg/ml. Electronically Signed On 9- 21:08:45 EDT by Leeann Boateng MD    Continue with  Saba Akers 200/5 doing 2 puffs twice daily- will stop this now  Albuterol as needed      Last eosinophils 7/20/2022-was 300    Overall this biggest issue is the cough and mucus production  Continues despite all the medications the patient has been on    FENO done 2/28/23  Was 27    Options at this time  1. Bronchoscopy to evaluate for infectious disease  2. Evaluation of the immune system, eosinophil levels and IgE and RAST testing  3. Mucinex  4.  Trelegy 200      Will stop the dulera  And start tomorrow morning on  Trelegy 200 use 1 puff a day    Call me in 2 weeks about inhaler      Ok to use  Mucinex daily either twice a day or in the morning      Will get option 2 work up    If  the above doesn't work, will do bronchscopy        Pulmonary nodules  Lifetime non-smoker    CT scan done 7/22/2022  COMPARISON: 5/17/2021. Impression    IMPRESSION:     1. Stable indeterminate pulmonary nodules. Follow-up CT in one year recommended. No new pulmonary nodule identified. 2. Mucous plugging and branching linear irregular opacities in the lateral segment of the middle lobe and in the left lower lobe compatible with bronchiolitis. Correlate clinically for chronic bronchiolitis. .       May need to have repeat CT scan in July 2023 as the patient does have a history of rheumatoid arthritis also    Allergic rhinitis  Continue with  Singulair/montelukast  Astelin nasal spray-stopped b/c didn't help  Sublingual immunotherapy via ENT  Saline nebulizer twice a day        Bronchiectasis  Seen on CT scan  CT scan done 7/22/2022  Mucous plugging and branching linear irregular opacities in the lateral segment of the middle lobe and in the left lower lobe compatible with bronchiolitis. Correlate clinically for chronic bronchiolitis. .     May benefit from vest therapy  Has tried the following  Incentive spirometry  Flutter valve  Percussion        Rheumatoid Arthritis  Was on Xelgans in the past  Now on  MTX    May need to have periodic PFTs to evaluate methotrexate effect on lungs        RTC in 8-10 weeks.                    Thank you for allowing me to assist in the care of the [facility], MD Monica Osuna MD

## 2023-02-28 NOTE — PATIENT INSTRUCTIONS
ASSESSMENT/PLAN:   Diagnosis Orders   1. Cough variant asthma  POCT Nitric Oxide      2. Pulmonary nodules  POCT Nitric Oxide      3. Non-seasonal allergic rhinitis due to pollen  POCT Nitric Oxide      4. Bronchiectasis without complication ([address]  POCT Nitric Oxide      5. Eosinophilic asthma        6. Severe asthma without complication, unspecified whether persistent        7. Rheumatoid arthritis, involving unspecified site, unspecified whether rheumatoid factor present ([address]            Cough variant asthma  More consistent with the following  Eosinophilic asthma/severe asthma  Complete PFT done on 9/21/2020 at [facility] is diminished suggestive of possible restrictive defect. BRONCHIAL CHALLENGE TEST   Challenge Test is a positive result. The PD 20 is 16 mg/ml. Electronically Signed On 9- 21:08:45 EDT by Kaitlynn Coleman MD    Continue with  [facility] 200/5 doing 2 puffs twice daily- will stop this now  Albuterol as needed      Last eosinophils 7/20/2022-was 300    Overall this biggest issue is the cough and mucus production  Continues despite all the medications the patient has been on    FENO done 2/28/23  Was 27    Options at this time  1. Bronchoscopy to evaluate for infectious disease  2. Evaluation of the immune system, eosinophil levels and IgE and RAST testing  3. Mucinex  4. Trelegy 200      Will stop the dulera  And start tomorrow morning on  Trelegy 200 use 1 puff a day    Call me in 2 weeks about inhaler      Ok to use  Mucinex daily either twice a day or in the morning      Will get option 2 work up    If  the above doesn't work, will do bronchscopy        Pulmonary nodules  Lifetime non-smoker    CT scan done 7/22/2022  COMPARISON: 5/17/2021. Impression    IMPRESSION:     1. Stable indeterminate pulmonary nodules. Follow-up CT in one year recommended. No new pulmonary nodule identified.    2. Mucous plugging and branching linear irregular opacities in the lateral segment of the middle lobe and in the left lower lobe compatible with bronchiolitis. Correlate clinically for chronic bronchiolitis. .       May need to have repeat CT scan in July 2023 as the patient does have a history of rheumatoid arthritis also    Allergic rhinitis  Continue with  Singulair/montelukast  Astelin nasal spray-stopped b/c didn't help  Sublingual immunotherapy via ENT  Saline nebulizer twice a day        Bronchiectasis  Seen on CT scan  CT scan done 7/22/2022  Mucous plugging and branching linear irregular opacities in the lateral segment of the middle lobe and in the left lower lobe compatible with bronchiolitis. Correlate clinically for chronic bronchiolitis. .     May benefit from vest therapy  Has tried the following  Incentive spirometry  Flutter valve  Percussion        Rheumatoid Arthritis  Was on Xelgans in the past  Now on  MTX    May need to have periodic PFTs to evaluate methotrexate effect on lungs        RTC in 8-10 weeks.

## 2023-03-02 LAB
IGE: 21 KU/L
IGG 4: 39 MG/DL (ref 1–123)

## 2023-03-03 LAB — IGE: 23 IU/ML

## 2023-03-13 ENCOUNTER — TELEPHONE (OUTPATIENT)
Dept: PULMONOLOGY | Age: 73
End: 2023-03-13

## 2023-03-13 NOTE — TELEPHONE ENCOUNTER
Pt called to inform Dr. Shraddha Valdes that since she started the Trelegy, she has had at least a 50% reduction in her cough. She is asking you to send in a script for her (pending).     She just also wanted you to be aware that she fell at home a couple of days ago and broke her wrist.

## 2023-04-24 ENCOUNTER — TELEPHONE (OUTPATIENT)
Dept: PULMONOLOGY | Age: 73
End: 2023-04-24

## 2023-04-24 ENCOUNTER — OFFICE VISIT (OUTPATIENT)
Dept: PULMONOLOGY | Age: 73
End: 2023-04-24
Payer: MEDICARE

## 2023-04-24 VITALS
HEIGHT: 64 IN | WEIGHT: 170 LBS | HEART RATE: 94 BPM | DIASTOLIC BLOOD PRESSURE: 70 MMHG | OXYGEN SATURATION: 96 % | BODY MASS INDEX: 29.02 KG/M2 | RESPIRATION RATE: 18 BRPM | SYSTOLIC BLOOD PRESSURE: 130 MMHG | TEMPERATURE: 97.2 F

## 2023-04-24 DIAGNOSIS — J30.1 NON-SEASONAL ALLERGIC RHINITIS DUE TO POLLEN: ICD-10-CM

## 2023-04-24 DIAGNOSIS — J45.991 COUGH VARIANT ASTHMA: Primary | ICD-10-CM

## 2023-04-24 DIAGNOSIS — J82.83 EOSINOPHILIC ASTHMA: ICD-10-CM

## 2023-04-24 DIAGNOSIS — J47.9 BRONCHIECTASIS WITHOUT COMPLICATION (HCC): ICD-10-CM

## 2023-04-24 DIAGNOSIS — M06.9 RHEUMATOID ARTHRITIS, INVOLVING UNSPECIFIED SITE, UNSPECIFIED WHETHER RHEUMATOID FACTOR PRESENT (HCC): ICD-10-CM

## 2023-04-24 DIAGNOSIS — R91.8 PULMONARY NODULES: ICD-10-CM

## 2023-04-24 DIAGNOSIS — J45.909 SEVERE ASTHMA WITHOUT COMPLICATION, UNSPECIFIED WHETHER PERSISTENT: ICD-10-CM

## 2023-04-24 PROCEDURE — 99214 OFFICE O/P EST MOD 30 MIN: CPT | Performed by: INTERNAL MEDICINE

## 2023-04-24 PROCEDURE — 1123F ACP DISCUSS/DSCN MKR DOCD: CPT | Performed by: INTERNAL MEDICINE

## 2023-04-24 ASSESSMENT — ENCOUNTER SYMPTOMS
SORE THROAT: 0
WHEEZING: 0
HEARTBURN: 0
HEMOPTYSIS: 0
EYES NEGATIVE: 1
RHINORRHEA: 0
GASTROINTESTINAL NEGATIVE: 1
ALLERGIC/IMMUNOLOGIC NEGATIVE: 1
COUGH: 1
SHORTNESS OF BREATH: 1

## 2023-04-24 NOTE — PROGRESS NOTES
MA Communication:   The following orders are received by verbal communication from Ron Jeffers MD    Orders include:  Tezspire paperwork       8 wk fu scheduled 7/17/23

## 2023-04-24 NOTE — PROGRESS NOTES
Kathi Cardenas (: 1950 ) is a 75 y.o. female here for an evaluation of   Chief Complaint   Patient presents with    Follow-up     8 week    Cough    Shortness of Breath           ASSESSMENT/PLAN:   Diagnosis Orders   1. Cough variant asthma        2. Pulmonary nodules        3. Non-seasonal allergic rhinitis due to pollen        4. Severe asthma without complication, unspecified whether persistent        5. Eosinophilic asthma        6. Bronchiectasis without complication ([facility]        7. Rheumatoid arthritis, involving unspecified site, unspecified whether rheumatoid factor present ([facility]            Cough variant asthma  More consistent with the following  Eosinophilic asthma/severe asthma  Complete PFT done on 2020 at [facility] is diminished suggestive of possible restrictive defect. BRONCHIAL CHALLENGE TEST   Challenge Test is a positive result. The PD 20 is 16 mg/ml. Electronically Signed On 2020 21:08:45 EDT by Mary Colvin MD    Continue with  Trelegy 200 using 1 puff a day  Albuterol as needed  Mucinex daily either twice a day or in the morning  On therapy with allergist with oral therapy. Last eosinophils 2022-was 300    Overall this biggest issue is the cough and mucus production  Continues despite all the medications the patient has been on    FENO done 23  Was 27    Options to consider  Bronchoscopy to evaluate for infectious disease  Could consider if worsening  In  had sputum culture and was negative        Blood work done 23  RAST testing is + and IGE was 23  IGG, IGA and IGM are all normal  Eosinophils are 100  Now on MTX but      Will start with  Tezspire once a month if not then go to   [address],        Pulmonary nodules  Lifetime non-smoker    CT scan done 2022  COMPARISON: 2021. Impression    IMPRESSION:     1. Stable indeterminate pulmonary nodules. Follow-up CT in one year recommended.  No new pulmonary nodule

## 2023-04-24 NOTE — PATIENT INSTRUCTIONS
ASSESSMENT/PLAN:   Diagnosis Orders   1. Cough variant asthma        2. Pulmonary nodules        3. Non-seasonal allergic rhinitis due to pollen        4. Severe asthma without complication, unspecified whether persistent        5. Eosinophilic asthma        6. Bronchiectasis without complication ([address]        7. Rheumatoid arthritis, involving unspecified site, unspecified whether rheumatoid factor present ([address]            Cough variant asthma  More consistent with the following  Eosinophilic asthma/severe asthma  Complete [facility] 9/21/[facility] at [facility] is diminished suggestive of possible restrictive defect. BRONCHIAL CHALLENGE TEST   Challenge Test is a positive result. The PD 20 is 16 mg/ml. Electronically Signed On 9- 21:08:45 EDT by Brittany Francisco MD    Continue with  Trelegy 200 using 1 puff a day  Albuterol as needed  Mucinex daily either twice a day or in the morning  On therapy with allergist with oral therapy. Last eosinophils 7/20/2022-was 300    Overall this biggest issue is the cough and mucus production  Continues despite all the medications the patient has been on    FENO done 2/28/23  Was 27    Options to consider  Bronchoscopy to evaluate for infectious disease  Could consider if worsening  In 2021 had sputum culture and was negative        Blood work done 2/28/23  RAST testing is + and IGE was 23  IGG, IGA and IGM are all normal  Eosinophils are 100  Now on MTX but      Will start with  Tezspire once a month if not then go to   [address],        Pulmonary nodules  Lifetime non-smoker    CT scan done 7/22/2022  COMPARISON: 5/17/2021. Impression    IMPRESSION:     1. Stable indeterminate pulmonary nodules. Follow-up CT in one year recommended. No new pulmonary nodule identified. 2. Mucous plugging and branching linear irregular opacities in the lateral segment of the middle lobe and in the left lower lobe compatible with bronchiolitis.  Correlate clinically

## 2023-04-27 ENCOUNTER — TELEPHONE (OUTPATIENT)
Dept: PULMONOLOGY | Age: 73
End: 2023-04-27

## 2023-04-27 RX ORDER — ROFLUMILAST 500 UG/1
500 TABLET ORAL DAILY
Qty: 90 TABLET | OUTPATIENT
Start: 2023-04-27

## 2023-04-27 RX ORDER — ROFLUMILAST 500 UG/1
500 TABLET ORAL DAILY
Qty: 30 TABLET | Refills: 3 | Status: SHIPPED | OUTPATIENT
Start: 2023-04-27

## 2023-04-27 NOTE — TELEPHONE ENCOUNTER
Pt informed and verbalized understanding. Pt said in the mean time that she called [facility] [employer] and they told her that Dr. Kate Goetz could possibly write a letter asking them to put Tezspire on her formulary. She is going to get the information and email it to me. She said after review and if they decide to put it on formulary, she could get it at a Tier 4 price which would be $100/month. She said it will actually be cheaper than the Dupixent which will be at a Tier 5.

## 2023-04-27 NOTE — TELEPHONE ENCOUNTER
Pt called and said that she called her insurance and asked them if Dannial Kylah could be put on patients formulary. If they approve, she can get it at Level 4 ($100/month), which will actually be less expensive than the Dupixent which will be at Level 5. Pt is going to email me tomorrow with all the information. She said they told her that Dr. Araceli Diamond could write a letter stating why he feels Dannial Kylah would be best medication for patient. I told her that I would have to send the message to Dr. Araceli Diamond to see if he would be willing to dictate a letter.

## 2023-04-27 NOTE — TELEPHONE ENCOUNTER
Paperwork signed and faxed to [phone] [employer] a couple days ago. Today I received a fax stating that Wanda Richardsonvirgie is not on the [address]. I have emailed [phone] [employer] rep to verify that this is correct.

## 2023-04-27 NOTE — TELEPHONE ENCOUNTER
I confirmed with IVX that they are not covered with [facility]. I also received a summary of benefit form and it showed that the Ruddy Speaks will be to expensive for patient as well. After speaking with Dr. Long Salinas, he said to see if [address] would be any cheaper for patient. I also called and s/w patient and asked if she felt comfortable to self administer the medication. She said she thought she would be OK. She said she may need a nurse from [address] to show her how or may come in office to see how it is administered. Paperwork filled out and pending signature. Page 1 emailed to pt ([email or web address]) to sign and send back. I provided pt with my email at work. [phone]

## 2023-04-27 NOTE — TELEPHONE ENCOUNTER
Pt called and said she had a bad day yesterday when the phlegm would get caught in her throat. It scares her. She said she didn't have this problem when taking Daliresp. Pt wants to know if she can go back to Daliresp and stop the Trelegy (at least until she can hopefully start [address])?

## 2023-04-28 ENCOUNTER — TELEPHONE (OUTPATIENT)
Dept: PULMONOLOGY | Age: 73
End: 2023-04-28

## 2023-04-28 NOTE — TELEPHONE ENCOUNTER
Yes lets cancel the Daliresp and go with the [facility]    I have sent it to the pharmacy

## 2023-04-28 NOTE — TELEPHONE ENCOUNTER
Please reference phone encounter from yesterday. Pt wanted to go back on Dulera instead of the Trelegy. I did not hear her correctly and requested that pt wanted to go back on Daliresp. You sent the Dalilresp to her pharmacy. Please advise, do you want pt to try the Daliresp (instead of the Trelegy), or do you want to cancel the Daliresp and change it to [facility]. I apologize about the mistake.

## 2023-04-28 NOTE — TELEPHONE ENCOUNTER
I called [employer] to find out how to proceed with this letter. I s/w Olga. She said to dictate letter and fax to [phone]. Please dictate letter stating why you feel Holt Constance should be added to her formulary instead of the alternatives (Dupixent  which would be more expensive at a Tier 5).     Letter must include patients Name/, ID# [id number], and put it to attn:  [employer] (Prior Auth Department)

## 2023-04-28 NOTE — TELEPHONE ENCOUNTER
I called [facility] and s/w Olga. I asked how we should proceed with this letter to try and get Paul Christianson put on patients formulary. She said Dr. Gadiel Martinez needs to dictate a letter stating why pt should take the Tezspire (in place of the [address]). Per pt it will be less expensive if Paul Steilacoom is approved at a Level 4. Dupixent would be a level 5.     Address letter to:  Prior Authorization Department          Fax # [phone]    Letter must include name of medication, strength, directions for Paul Steilacoom    Also needs to include patients name/, ID # [id number]    Please dictate letter and I will fax to them

## 2023-05-05 ENCOUNTER — TELEPHONE (OUTPATIENT)
Dept: PULMONOLOGY | Age: 73
End: 2023-05-05

## 2023-05-05 RX ORDER — PREDNISONE 10 MG/1
TABLET ORAL
Qty: 20 TABLET | Refills: 0 | Status: SHIPPED | OUTPATIENT
Start: 2023-05-05

## 2023-05-05 NOTE — TELEPHONE ENCOUNTER
Pt called and said has had bad coughing spasms all week. She feels like phlegm is in the back of her throat that she can barely get anything up. She coughs so hard that she actually has to pull over when she drives. It is taking a lot out of her and she is very fatigued. She said she does not really feel sick, just wiped out. Denies wheezing or fever. She has had to use her albuterol inhaler up to 5 times a day this week when she normally only uses it maybe 2 times a day. Pt has been taking her Dulera and Trelegy. Please advise.     Pharmacy:  [facility]

## 2023-05-05 NOTE — TELEPHONE ENCOUNTER
I have sent steroids to her pharmacy. May use Mucinex or guaifenesin 600-1200 mg twice a day to help thin secretions. Drink with plenty of water. Ensure she is taking her Dulera 2 puffs twice a day , rinse mouth out after use to prevent hoarseness and thrush. Trelegy should not be taken along with [facility]. If no improvement with treatment plan,  I would like to see her in office.

## 2023-05-16 ENCOUNTER — TELEPHONE (OUTPATIENT)
Dept: PULMONOLOGY | Age: 73
End: 2023-05-16

## 2023-05-25 NOTE — TELEPHONE ENCOUNTER
Pt called and said she heard from her insurance and Dupixent was denied by her insurance. We have not received the denial letter yet. Are you going to want to appeal or do a peer to peer? Please advise next step.

## 2023-05-26 NOTE — TELEPHONE ENCOUNTER
The insurance has to cover something, it may not be Dupixent or Tezspire  Should be consider sending either Nucala or Arlana Carton?

## 2023-05-26 NOTE — TELEPHONE ENCOUNTER
Patient called back and informed her of message she will check with Insurance and reach out to our office soon

## 2023-05-26 NOTE — TELEPHONE ENCOUNTER
I left detailed message on [employer] machine asking her to call her insurance to see which of the following medications is on their formulary:  Rana Apgar or Horace's. Once we know what is formulary, we can submit that paperwork.

## 2023-06-07 ENCOUNTER — TELEPHONE (OUTPATIENT)
Dept: PULMONOLOGY | Age: 73
End: 2023-06-07

## 2023-06-07 NOTE — TELEPHONE ENCOUNTER
Patient is calling asking for a call from Dr. Esperanza Sandhu MA regarding dupixent. Please call patient.

## 2023-06-09 NOTE — TELEPHONE ENCOUNTER
GUTIERREZ pt.  She wanted to inform me that she did qualify for pt assistance and they are shipping the [address] out to her. She is very hopeful that it will work well for her. Script/Pt Assistance will need to be updated in January 2024.

## 2023-06-21 RX ORDER — DUPILUMAB 300 MG/2ML
300 INJECTION, SOLUTION SUBCUTANEOUS ONCE
Qty: 2 ML | Refills: 0 | Status: SHIPPED | OUTPATIENT
Start: 2023-06-21 | End: 2023-06-21

## 2023-06-30 ENCOUNTER — TELEPHONE (OUTPATIENT)
Dept: PULMONOLOGY | Age: 73
End: 2023-06-30

## 2023-06-30 RX ORDER — ALBUTEROL SULFATE 90 UG/1
2 AEROSOL, METERED RESPIRATORY (INHALATION) EVERY 6 HOURS PRN
Qty: 18 G | Refills: 3 | Status: SHIPPED | OUTPATIENT
Start: 2023-06-30

## 2023-06-30 RX ORDER — MONTELUKAST SODIUM 10 MG/1
10 TABLET ORAL NIGHTLY
Qty: 90 TABLET | Refills: 1 | Status: SHIPPED | OUTPATIENT
Start: 2023-06-30 | End: 2024-06-24

## 2023-08-21 RX ORDER — MOMETASONE FUROATE AND FORMOTEROL FUMARATE DIHYDRATE 200; 5 UG/1; UG/1
AEROSOL RESPIRATORY (INHALATION)
Qty: 8.8 G | Refills: 5 | Status: SHIPPED | OUTPATIENT
Start: 2023-08-21

## 2023-08-21 NOTE — TELEPHONE ENCOUNTER
Last office visit 2/28/2023     Last written 4/28/2023     Next office visit scheduled 8/28/2023    Requested Prescriptions     Pending Prescriptions Disp Refills    [place] 200-5 MCG/ACT inhaler [Pharmacy Med Name: [place] 200-5MCG ORAL INHALER 60INH] 8.8 g      Sig: INHALE 2 PUFFS INTO THE LUNGS IN THE MORNING AND IN THE EVENING           PLEASE REVIEW. Pt wants to get 2 inhalers at a time.

## 2023-08-21 NOTE — TELEPHONE ENCOUNTER
Patient is wondering if he could order 2 so she hasn't have to call for another one in 16 days. Please advise.  thanks

## 2023-08-28 ENCOUNTER — OFFICE VISIT (OUTPATIENT)
Dept: PULMONOLOGY | Age: 73
End: 2023-08-28
Payer: MEDICARE

## 2023-08-28 VITALS
HEIGHT: 64 IN | OXYGEN SATURATION: 99 % | BODY MASS INDEX: 30.39 KG/M2 | SYSTOLIC BLOOD PRESSURE: 118 MMHG | WEIGHT: 178 LBS | RESPIRATION RATE: 16 BRPM | TEMPERATURE: 98 F | HEART RATE: 90 BPM | DIASTOLIC BLOOD PRESSURE: 70 MMHG

## 2023-08-28 DIAGNOSIS — J82.83 EOSINOPHILIC ASTHMA: ICD-10-CM

## 2023-08-28 DIAGNOSIS — J45.909 SEVERE ASTHMA WITHOUT COMPLICATION, UNSPECIFIED WHETHER PERSISTENT: ICD-10-CM

## 2023-08-28 DIAGNOSIS — J47.9 BRONCHIECTASIS WITHOUT COMPLICATION (HCC): ICD-10-CM

## 2023-08-28 DIAGNOSIS — M06.9 RHEUMATOID ARTHRITIS, INVOLVING UNSPECIFIED SITE, UNSPECIFIED WHETHER RHEUMATOID FACTOR PRESENT (HCC): ICD-10-CM

## 2023-08-28 DIAGNOSIS — J45.991 COUGH VARIANT ASTHMA: Primary | ICD-10-CM

## 2023-08-28 DIAGNOSIS — J30.1 NON-SEASONAL ALLERGIC RHINITIS DUE TO POLLEN: ICD-10-CM

## 2023-08-28 DIAGNOSIS — R91.8 PULMONARY NODULES: ICD-10-CM

## 2023-08-28 PROCEDURE — 99214 OFFICE O/P EST MOD 30 MIN: CPT | Performed by: INTERNAL MEDICINE

## 2023-08-28 PROCEDURE — 1123F ACP DISCUSS/DSCN MKR DOCD: CPT | Performed by: INTERNAL MEDICINE

## 2023-08-28 RX ORDER — OMEPRAZOLE 40 MG/1
40 CAPSULE, DELAYED RELEASE ORAL
Qty: 30 CAPSULE | Refills: 4 | Status: SHIPPED | OUTPATIENT
Start: 2023-08-28

## 2023-08-28 RX ORDER — AZELASTINE 1 MG/ML
2 SPRAY, METERED NASAL 2 TIMES DAILY
Qty: 120 ML | Refills: 1 | Status: SHIPPED | OUTPATIENT
Start: 2023-08-28

## 2023-08-28 RX ORDER — BUDESONIDE, GLYCOPYRROLATE, AND FORMOTEROL FUMARATE 160; 9; 4.8 UG/1; UG/1; UG/1
2 AEROSOL, METERED RESPIRATORY (INHALATION) 2 TIMES DAILY
Qty: 1 EACH | Refills: 3 | Status: SHIPPED | OUTPATIENT
Start: 2023-08-28 | End: 2023-09-01

## 2023-08-28 RX ORDER — MONTELUKAST SODIUM 10 MG/1
10 TABLET ORAL DAILY
Qty: 30 TABLET | Refills: 3 | Status: SHIPPED | OUTPATIENT
Start: 2023-08-28

## 2023-08-28 RX ORDER — FLUTICASONE PROPIONATE 50 MCG
2 SPRAY, SUSPENSION (ML) NASAL DAILY
Qty: 48 G | Refills: 1 | Status: SHIPPED | OUTPATIENT
Start: 2023-08-28

## 2023-08-28 ASSESSMENT — ENCOUNTER SYMPTOMS
EYES NEGATIVE: 1
ALLERGIC/IMMUNOLOGIC NEGATIVE: 1
SHORTNESS OF BREATH: 1
COUGH: 1
HEMOPTYSIS: 0
WHEEZING: 0
GASTROINTESTINAL NEGATIVE: 1
SORE THROAT: 0
RHINORRHEA: 0
HEARTBURN: 0

## 2023-08-28 NOTE — PROGRESS NOTES
MA Communication:   The following orders are received by verbal communication from Anahi Godinez MD    Orders include:  6-8 week f/u

## 2023-08-28 NOTE — PROGRESS NOTES
Yanni Aguilar (: 1950 ) is a 75 y.o. female here for an evaluation of   Chief Complaint   Patient presents with    Follow-up    Asthma    Shortness of Breath           ASSESSMENT/PLAN:   Diagnosis Orders   1. Cough variant asthma        2. Pulmonary nodules        3. Non-seasonal allergic rhinitis due to pollen        4. Severe asthma without complication, unspecified whether persistent        5. Eosinophilic asthma        6. Bronchiectasis without complication ([address])        7. Rheumatoid arthritis, involving unspecified site, unspecified whether rheumatoid factor present ([address])            Cough variant asthma  More consistent with the following  Eosinophilic asthma/severe asthma  Complete PFT done on 2020 at [facility] is diminished suggestive of possible restrictive defect. BRONCHIAL CHALLENGE TEST   Challenge Test is a positive result. The PD 20 is 16 mg/ml. Electronically Signed On 2020 21:08:45 EDT by Yeimi Barnes MD    Continue with  Orvil Wei 200/5  2puffs twice a day  Albuterol as needed  Mucinex daily either twice a day or in the morning  On therapy with allergist with oral therapy. Last eosinophils 2022-was 300    Overall this biggest issue is the cough and mucus production  Continues despite all the medications the patient has been on    Albuterol as helped    Tried and failed with   Trelegy 200    FENO done 23  Was 27    Options to consider  Bronchoscopy to evaluate for infectious disease  Could consider if worsening  In  had sputum culture and was negative    2. Will send in for Breztri to try instead of [place] is 2 puffs twice a day  3. Will stay with dupixent for at least another month  4. Consider Tezspire  5.  Could try gabapentin      Will start on  Omeprazole 40 mg daily      Blood work done 23  RAST testing is + and IGE was 23  IGG, IGA and IGM are all normal  Eosinophils are 100  Now on MTX but            Pulmonary

## 2023-08-28 NOTE — PATIENT INSTRUCTIONS
recommended. No new pulmonary nodule identified. 2. Mucous plugging and branching linear irregular opacities in the lateral segment of the middle lobe and in the left lower lobe compatible with bronchiolitis. Correlate clinically for chronic bronchiolitis. .       May need to have repeat CT scan in July 2023 as the patient does have a history of rheumatoid arthritis also    Allergic rhinitis  Continue with  Singulair/montelukast  Astelin nasal spray  Flonase at night  Sublingual immunotherapy via ENT  Saline nebulizer twice a day        Bronchiectasis  Seen on CT scan  CT scan done 7/22/2022  Mucous plugging and branching linear irregular opacities in the lateral segment of the middle lobe and in the left lower lobe compatible with bronchiolitis. Correlate clinically for chronic bronchiolitis. .     May benefit from vest therapy  Has tried the following  Incentive spirometry  Flutter valve  Percussion        Rheumatoid Arthritis  Was on Maria D John in the past in 2021  Now on  MTX    Could the MTX be causing the cough b/c of pulm fibrosis      May need to have periodic PFTs to evaluate methotrexate effect on lungs        RTC in 6-8 weeks. .  Remember to bring a list of pulmonary medications and any CPAP or BiPAP machines to your next appointment with the office. Please keep all of your future appointments scheduled by [facility] office. Out of respect for other patients and providers, you may be asked to reschedule your appointment if you arrive later than your scheduled appointment time. Appointments cancelled less than 24hrs in advance will be considered a no show. Patients with three missed appointments within 1 year or four missed appointments within 2 years can be dismissed from the practice.      Please be aware that our physicians are required to work in the Intensive Care Unit at [facility].  Your appointment may need to be rescheduled if they are designated to

## 2023-09-01 ENCOUNTER — TELEPHONE (OUTPATIENT)
Dept: PULMONOLOGY | Age: 73
End: 2023-09-01

## 2023-09-01 NOTE — TELEPHONE ENCOUNTER
Patient started taking Breztri on Tuesday and it has helped with her cough however very concerned with Red Spots on her skin  Ankles are slightly Swollen, Skin is red as she has a sun burn  The redness of the face onset today after she did her dose

## 2023-09-01 NOTE — TELEPHONE ENCOUNTER
Spoke with Michael Carrion, instructed to stop Anne and return back to [facility]. Monitor symptoms. Seek immediate medical attention at the Emergency Room if she has tongue swelling or difficulty breathing or skin rash worsens. I will add Breztri to her allergies. States she is disappointment because it was helping her breathe so much better. She thought she hit the jackpot with this medication and disappointed she needs to stop. Discussed holding Breztri to see if symptoms improve. If still having symptoms days after last dose, may not be the cause. Rakel Rojo verbalized understanding and thanked for care.

## 2023-10-10 ENCOUNTER — OFFICE VISIT (OUTPATIENT)
Dept: PULMONOLOGY | Age: 73
End: 2023-10-10
Payer: MEDICARE

## 2023-10-10 VITALS
BODY MASS INDEX: 30.16 KG/M2 | WEIGHT: 181 LBS | RESPIRATION RATE: 18 BRPM | OXYGEN SATURATION: 99 % | HEIGHT: 65 IN | DIASTOLIC BLOOD PRESSURE: 75 MMHG | HEART RATE: 98 BPM | SYSTOLIC BLOOD PRESSURE: 126 MMHG | TEMPERATURE: 98.6 F

## 2023-10-10 DIAGNOSIS — M06.9 RHEUMATOID ARTHRITIS, INVOLVING UNSPECIFIED SITE, UNSPECIFIED WHETHER RHEUMATOID FACTOR PRESENT (HCC): ICD-10-CM

## 2023-10-10 DIAGNOSIS — J30.1 SEASONAL ALLERGIC RHINITIS DUE TO POLLEN: ICD-10-CM

## 2023-10-10 DIAGNOSIS — K21.9 GASTROESOPHAGEAL REFLUX DISEASE WITHOUT ESOPHAGITIS: ICD-10-CM

## 2023-10-10 DIAGNOSIS — R91.8 PULMONARY NODULES: ICD-10-CM

## 2023-10-10 DIAGNOSIS — J82.83 EOSINOPHILIC ASTHMA: ICD-10-CM

## 2023-10-10 DIAGNOSIS — J45.909 SEVERE ASTHMA WITHOUT COMPLICATION, UNSPECIFIED WHETHER PERSISTENT: ICD-10-CM

## 2023-10-10 DIAGNOSIS — J47.9 BRONCHIECTASIS WITHOUT COMPLICATION (HCC): ICD-10-CM

## 2023-10-10 DIAGNOSIS — J45.991 COUGH VARIANT ASTHMA: Primary | ICD-10-CM

## 2023-10-10 PROCEDURE — 1123F ACP DISCUSS/DSCN MKR DOCD: CPT | Performed by: INTERNAL MEDICINE

## 2023-10-10 PROCEDURE — 99214 OFFICE O/P EST MOD 30 MIN: CPT | Performed by: INTERNAL MEDICINE

## 2023-10-10 RX ORDER — OMEPRAZOLE 40 MG/1
40 CAPSULE, DELAYED RELEASE ORAL 2 TIMES DAILY
Qty: 60 CAPSULE | Refills: 1 | Status: SHIPPED | OUTPATIENT
Start: 2023-10-10

## 2023-10-10 ASSESSMENT — ENCOUNTER SYMPTOMS
ALLERGIC/IMMUNOLOGIC NEGATIVE: 1
SHORTNESS OF BREATH: 1
RHINORRHEA: 0
HEMOPTYSIS: 0
EYES NEGATIVE: 1
WHEEZING: 0
SORE THROAT: 0
COUGH: 1
GASTROINTESTINAL NEGATIVE: 1
HEARTBURN: 0

## 2023-10-10 NOTE — PROGRESS NOTES
MA Communication:   The following orders are received by verbal communication from Raiza Castrejon MD    Orders include:  CT-Pt doing @Jaime, 6-8 week f/u- add on if needed

## 2023-10-10 NOTE — PATIENT INSTRUCTIONS
ASSESSMENT/PLAN:   Diagnosis Orders   1. Cough variant asthma        2. Severe asthma without complication, unspecified whether persistent        3. Eosinophilic asthma        4. Pulmonary nodules        5. Seasonal allergic rhinitis due to pollen        6. Bronchiectasis without complication ([address])        7. Rheumatoid arthritis, involving unspecified site, unspecified whether rheumatoid factor present ([address])        8. Gastroesophageal reflux disease without esophagitis            Cough variant asthma  More consistent with the following  Eosinophilic asthma/severe asthma  Complete PFT done on 9/21/2020 at [facility] is diminished suggestive of possible restrictive defect. BRONCHIAL CHALLENGE TEST   Challenge Test is a positive result. The PD 20 is 16 mg/ml. Electronically Signed On 9- 21:08:45 EDT by Raiza House MD    Continue with  Carlos Das 2 puffs twice a day  Albuterol as needed  Mucinex daily either twice a day or in the morning  On therapy with allergist with oral therapy. Dupixent twice a month now on #4 month  Omeprazole 40 mg daily    Last eosinophils 7/20/2022-was 300    Overall this biggest issue is the cough and mucus production  Continues despite all the medications the patient has been on    Albuterol as helped    Tried and failed with   Trelegy 200  Dulera      FENO done 2/28/23  Was 27    Options to consider  Bronchoscopy to evaluate for infectious disease  Could consider if worsening  In 2021 had sputum culture and was negative    3. Will stay with dupixent for at least another month  4. Consider Tezspire  5. Could try gabapentin  6.  Increase omeprazole to twice a day        Will do the following  # 3, #6    At next appt will stop dupixent as she has had full 6 months dose at that time    Blood work done 2/28/23  RAST testing is + and IGE was 23  IGG, IGA and IGM are all normal  Eosinophils are 100  Now on MTX but  Eosinophils on 10/4/2023 were 400      EMELY pattern is

## 2023-10-10 NOTE — PROGRESS NOTES
Sandy Marie (: 1950 ) is a 75 y.o. female here for an evaluation of   Chief Complaint   Patient presents with    Follow-up     6-8  week Check Cough Variant Asthma           ASSESSMENT/PLAN:   Diagnosis Orders   1. Cough variant asthma        2. Severe asthma without complication, unspecified whether persistent        3. Eosinophilic asthma        4. Pulmonary nodules        5. Seasonal allergic rhinitis due to pollen        6. Bronchiectasis without complication ([address])        7. Rheumatoid arthritis, involving unspecified site, unspecified whether rheumatoid factor present ([address])        8. Gastroesophageal reflux disease without esophagitis            Cough variant asthma  More consistent with the following  Eosinophilic asthma/severe asthma  Complete PFT done on 2020 at [facility] is diminished suggestive of possible restrictive defect. BRONCHIAL CHALLENGE TEST   Challenge Test is a positive result. The PD 20 is 16 mg/ml. Electronically Signed On 2020 21:08:45 EDT by Cam Preston MD    Continue with  Wayne Dugan 2 puffs twice a day  Albuterol as needed  Mucinex daily either twice a day or in the morning  On therapy with allergist with oral therapy. Dupixent twice a month now on #4 month  Omeprazole 40 mg daily    Last eosinophils 2022-was 300    Overall this biggest issue is the cough and mucus production  Continues despite all the medications the patient has been on    Albuterol as helped    Tried and failed with   Trelegy 200  Dulera      FENO done 23  Was 27    Options to consider  Bronchoscopy to evaluate for infectious disease  Could consider if worsening  In  had sputum culture and was negative    3. Will stay with dupixent for at least another month  4. Consider Tezspire  5. Could try gabapentin  6.  Increase omeprazole to twice a day        Will do the following  # 3, #6    At next appt will stop dupixent as she has had full 6 months dose at that

## 2023-11-07 NOTE — TELEPHONE ENCOUNTER
Patient is wanting script sent to [facility] pharmacy, the phone number to them is [phone].     Please advise, thanks

## 2023-11-07 NOTE — TELEPHONE ENCOUNTER
Phone number listed is a fax number and not linked to any pharmacies in our system. Selected a [facility] with the same area code.

## 2023-11-08 RX ORDER — BUDESONIDE, GLYCOPYRROLATE, AND FORMOTEROL FUMARATE 160; 9; 4.8 UG/1; UG/1; UG/1
2 AEROSOL, METERED RESPIRATORY (INHALATION) 2 TIMES DAILY
Qty: 1 EACH | Refills: 3 | Status: SHIPPED | OUTPATIENT
Start: 2023-11-08 | End: 2023-11-09

## 2023-11-09 RX ORDER — BUDESONIDE, GLYCOPYRROLATE, AND FORMOTEROL FUMARATE 160; 9; 4.8 UG/1; UG/1; UG/1
2 AEROSOL, METERED RESPIRATORY (INHALATION) 2 TIMES DAILY
Qty: 1 EACH | Refills: 3 | Status: SHIPPED | OUTPATIENT
Start: 2023-11-09

## 2023-11-09 NOTE — TELEPHONE ENCOUNTER
Pharmacy called never received RX   Updated to correct pharmacy please resend  Going to [facility].  [phone]

## 2023-11-21 ENCOUNTER — TELEPHONE (OUTPATIENT)
Dept: PULMONOLOGY | Age: 73
End: 2023-11-21

## 2023-11-21 NOTE — TELEPHONE ENCOUNTER
Pt returned call  Received form from [facility] & will complete and send back

## 2023-11-21 NOTE — TELEPHONE ENCOUNTER
Received fax from [facility] about getting the Pt re-enrolled for their Pt assistance Program.     Need to know if I need to send her the form for her to complete or if Dupixent has already contacted her.

## 2023-11-22 ENCOUNTER — HOSPITAL ENCOUNTER (OUTPATIENT)
Dept: CT IMAGING | Age: 73
Discharge: HOME | End: 2023-11-22
Attending: INTERNAL MEDICINE
Payer: MEDICARE

## 2023-11-22 DIAGNOSIS — R91.8 PULMONARY NODULES: ICD-10-CM

## 2023-11-22 DIAGNOSIS — J45.991 COUGH VARIANT ASTHMA: ICD-10-CM

## 2023-11-22 PROCEDURE — 71250 CT THORAX DX C-: CPT

## 2023-11-27 ENCOUNTER — OFFICE VISIT (OUTPATIENT)
Dept: PULMONOLOGY | Age: 73
End: 2023-11-27
Payer: MEDICARE

## 2023-11-27 VITALS
OXYGEN SATURATION: 97 % | HEART RATE: 83 BPM | HEIGHT: 65 IN | WEIGHT: 183 LBS | BODY MASS INDEX: 30.49 KG/M2 | SYSTOLIC BLOOD PRESSURE: 116 MMHG | RESPIRATION RATE: 18 BRPM | TEMPERATURE: 97.3 F | DIASTOLIC BLOOD PRESSURE: 70 MMHG

## 2023-11-27 DIAGNOSIS — J45.909 SEVERE ASTHMA WITHOUT COMPLICATION, UNSPECIFIED WHETHER PERSISTENT: ICD-10-CM

## 2023-11-27 DIAGNOSIS — K21.9 GASTROESOPHAGEAL REFLUX DISEASE WITHOUT ESOPHAGITIS: ICD-10-CM

## 2023-11-27 DIAGNOSIS — J82.83 EOSINOPHILIC ASTHMA: ICD-10-CM

## 2023-11-27 DIAGNOSIS — J30.1 NON-SEASONAL ALLERGIC RHINITIS DUE TO POLLEN: ICD-10-CM

## 2023-11-27 DIAGNOSIS — J47.9 BRONCHIECTASIS WITHOUT COMPLICATION (HCC): ICD-10-CM

## 2023-11-27 DIAGNOSIS — M06.9 RHEUMATOID ARTHRITIS, INVOLVING UNSPECIFIED SITE, UNSPECIFIED WHETHER RHEUMATOID FACTOR PRESENT (HCC): ICD-10-CM

## 2023-11-27 DIAGNOSIS — R91.8 PULMONARY NODULES: ICD-10-CM

## 2023-11-27 DIAGNOSIS — J45.991 COUGH VARIANT ASTHMA: Primary | ICD-10-CM

## 2023-11-27 PROCEDURE — 99214 OFFICE O/P EST MOD 30 MIN: CPT | Performed by: INTERNAL MEDICINE

## 2023-11-27 PROCEDURE — 1123F ACP DISCUSS/DSCN MKR DOCD: CPT | Performed by: INTERNAL MEDICINE

## 2023-11-27 ASSESSMENT — ENCOUNTER SYMPTOMS
HEMOPTYSIS: 0
GASTROINTESTINAL NEGATIVE: 1
RHINORRHEA: 0
HEARTBURN: 0
SORE THROAT: 0
WHEEZING: 0
EYES NEGATIVE: 1
COUGH: 1
ALLERGIC/IMMUNOLOGIC NEGATIVE: 1
SHORTNESS OF BREATH: 1

## 2023-11-27 NOTE — PROGRESS NOTES
MA Communication:   The following orders are received by verbal communication from Johanne Mario MD    Orders include:  3 month f/u
[place]. In the past was started on Dulera inhaler but developed thrush and for a few weeks could not use the inhaler. Once thrush was treated with oral fluconazole, eventually patient was able to restart [facility] and this time no thrush reoccurred. Has been using the inhaler regularly. Rarely uses albuterol. Also takes Astelin nasal spray along with Singulair. Also undergoing sublingual immunotherapy through ENT. Has been diagnosed to have bilateral bronchiectasis and currently using Acapella device. Takes methotrexate for psoriasis. IMPRESSION AND RECOMMENDATIONS:      1. Cough variant asthma  -Patient has been diagnosed to have cough variant asthma based on her episodic symptoms as well as positive methacholine challenge test.  -Continue with Dulera at 200/5 mcg 2 puff twice daily. As of now her symptoms remain persistent and I will continue with high-dose Dulera. No indications for adding additional steroid inhaler.  -Use albuterol as needed.  -I advised the patient to use both these inhalers with a spacer chamber to reduce the risk of recurrence of thrush.  -Also using Acapella device bilateral bronchiectasis. 2. Pulmonary nodules  -Patient has had both right lower lobe as well as left lower lobe subcentimeter pulm nodules which have been present since 2019.  -She is a lifelong non-smoker with no previous history of cancers.  -Her risk for lung malignancy is low.  -At this time I do not see any indication for repeat chest imaging. 3. Seasonal allergic rhinitis due to pollen  -Continue taking montelukast and Astelin nasal spray.  -Also undergoing sublingual immunotherapy via ENT. -She will follow-up with ENT in the near future. 4. Bronchiectasis without complication (HCC)  -Currently not in exacerbation.  -She will use Acapella device regularly in order to help her expectorate.   -Advised the patient to use albuterol first thing in the morning and then try Acapella device in order to

## 2023-11-27 NOTE — PATIENT INSTRUCTIONS
effect on lungs        RTC in 3 months  Remember to bring a list of pulmonary medications and any CPAP or BiPAP machines to your next appointment with the office. Please keep all of your future appointments scheduled by [address] [facility] office. Out of respect for other patients and providers, you may be asked to reschedule your appointment if you arrive later than your scheduled appointment time. Appointments cancelled less than 24hrs in advance will be considered a no show. Patients with three missed appointments within 1 year or four missed appointments within 2 years can be dismissed from the practice. Please be aware that our physicians are required to work in the Intensive Care Unit at [facility].  Your appointment may need to be rescheduled if they are designated to work during your appointment time. You may receive a survey regarding the care you received during your visit. Your input is valuable to us. We encourage you to complete and return your survey. We hope you will choose us in the future for your healthcare needs. Pt instructed of all future appointment dates & times, including radiology, labs, procedures & referrals. If procedures were scheduled preparation instructions provided. Instructions on future appointments with [facility] were given.

## 2024-02-19 ENCOUNTER — OFFICE VISIT (OUTPATIENT)
Dept: PULMONOLOGY | Age: 74
End: 2024-02-19
Payer: MEDICARE

## 2024-02-19 VITALS
WEIGHT: 174.5 LBS | HEART RATE: 95 BPM | HEIGHT: 65 IN | OXYGEN SATURATION: 96 % | SYSTOLIC BLOOD PRESSURE: 138 MMHG | RESPIRATION RATE: 16 BRPM | DIASTOLIC BLOOD PRESSURE: 78 MMHG | BODY MASS INDEX: 29.07 KG/M2 | TEMPERATURE: 97 F

## 2024-02-19 DIAGNOSIS — K21.9 GASTROESOPHAGEAL REFLUX DISEASE WITHOUT ESOPHAGITIS: ICD-10-CM

## 2024-02-19 DIAGNOSIS — J82.83 EOSINOPHILIC ASTHMA: ICD-10-CM

## 2024-02-19 DIAGNOSIS — M06.9 RHEUMATOID ARTHRITIS, INVOLVING UNSPECIFIED SITE, UNSPECIFIED WHETHER RHEUMATOID FACTOR PRESENT (HCC): ICD-10-CM

## 2024-02-19 DIAGNOSIS — J45.909 SEVERE ASTHMA WITHOUT COMPLICATION, UNSPECIFIED WHETHER PERSISTENT: ICD-10-CM

## 2024-02-19 DIAGNOSIS — R91.8 PULMONARY NODULES: ICD-10-CM

## 2024-02-19 DIAGNOSIS — J30.1 NON-SEASONAL ALLERGIC RHINITIS DUE TO POLLEN: ICD-10-CM

## 2024-02-19 DIAGNOSIS — J47.9 BRONCHIECTASIS WITHOUT COMPLICATION (HCC): ICD-10-CM

## 2024-02-19 DIAGNOSIS — J45.991 COUGH VARIANT ASTHMA: Primary | ICD-10-CM

## 2024-02-19 PROCEDURE — 1123F ACP DISCUSS/DSCN MKR DOCD: CPT | Performed by: INTERNAL MEDICINE

## 2024-02-19 PROCEDURE — 99214 OFFICE O/P EST MOD 30 MIN: CPT | Performed by: INTERNAL MEDICINE

## 2024-02-19 RX ORDER — BUDESONIDE, GLYCOPYRROLATE, AND FORMOTEROL FUMARATE 160; 9; 4.8 UG/1; UG/1; UG/1
2 AEROSOL, METERED RESPIRATORY (INHALATION) 2 TIMES DAILY
Qty: 3 EACH | Refills: 3 | Status: SHIPPED | OUTPATIENT
Start: 2024-02-19

## 2024-02-19 RX ORDER — AZELASTINE 1 MG/ML
2 SPRAY, METERED NASAL 2 TIMES DAILY
Qty: 3 EACH | Refills: 3 | Status: SHIPPED | OUTPATIENT
Start: 2024-02-19 | End: 2024-05-19

## 2024-02-19 RX ORDER — OMEPRAZOLE 40 MG/1
40 CAPSULE, DELAYED RELEASE ORAL 2 TIMES DAILY
Qty: 180 CAPSULE | Refills: 1 | Status: SHIPPED | OUTPATIENT
Start: 2024-02-19

## 2024-02-19 RX ORDER — FLUTICASONE PROPIONATE 50 MCG
2 SPRAY, SUSPENSION (ML) NASAL DAILY
Qty: 3 EACH | Refills: 4 | Status: SHIPPED | OUTPATIENT
Start: 2024-02-19

## 2024-02-19 ASSESSMENT — ENCOUNTER SYMPTOMS
ALLERGIC/IMMUNOLOGIC NEGATIVE: 1
SHORTNESS OF BREATH: 1
HEMOPTYSIS: 0
SORE THROAT: 0
EYES NEGATIVE: 1
HEARTBURN: 0
GASTROINTESTINAL NEGATIVE: 1
RHINORRHEA: 0
WHEEZING: 0
COUGH: 1

## 2024-02-19 NOTE — PATIENT INSTRUCTIONS
valve  Percussion        Rheumatoid Arthritis  Was on Xelgans in the past in 2021  Now on  MTX    Could the MTX be causing the cough b/c of pulm fibrosis      May need to have periodic PFTs to evaluate methotrexate effect on lungs        RTC in 3 months

## 2024-02-19 NOTE — PROGRESS NOTES
Kathya Hamilton (: 1950 ) is a 73 y.o. female here for an evaluation of   Chief Complaint   Patient presents with    Asthma     Patient is here for a 3 month asthma follow up           ASSESSMENT/PLAN:   Diagnosis Orders   1. Cough variant asthma        2. Severe asthma without complication, unspecified whether persistent        3. Eosinophilic asthma        4. Bronchiectasis without complication (HCC)        5. Non-seasonal allergic rhinitis due to pollen        6. Gastroesophageal reflux disease without esophagitis  MAXIMO - Norbert Carballo MD, Gastroenterology, [facility]      7. Rheumatoid arthritis, involving unspecified site, unspecified whether rheumatoid factor present (HCC)        8. Pulmonary nodules  CT CHEST WO CONTRAST          Cough variant asthma  More consistent with the following  Eosinophilic asthma/severe asthma  Complete PFT done on 2020 at [facility]  FVC is diminished suggestive of possible restrictive defect.   BRONCHIAL CHALLENGE TEST   Challenge Test is a positive result.  The PD 20 is 16 mg/ml.   Electronically Signed On 2020 21:08:45 EDT by Daren Skaggs MD    Continue with  Breztri 2 puffs twice a day  Albuterol as needed  Mucinex daily as needed  On therapy with allergist with oral therapy.  Dupixent twice a month - started   Omeprazole 40 mg twice a day  Flonase  astelin    Last eosinophils 2022-was 300    Overall this biggest issue is the cough and mucus production  Continues despite all the medications the patient has been on    Albuterol as helped    Tried and failed with   Trelegy 200  Dulera      FENO done 23  Was 27    Options to consider- plan on 10/10/23  Bronchoscopy to evaluate for infectious disease  Could consider if worsening  In  had sputum culture and was negative    3. Will stay with dupixent for at least another month  4. Consider Tezspire  5. Could try gabapentin  6. Increase omeprazole to twice a

## 2024-02-19 NOTE — PROGRESS NOTES
MA Communication:  The following orders are received by verbal communication from Panchito Weiss MD    Orders include:    3 month follow up   CT-pt will call to schedule

## 2024-02-20 ENCOUNTER — TELEPHONE (OUTPATIENT)
Dept: PULMONOLOGY | Age: 74
End: 2024-02-20

## 2024-02-20 NOTE — TELEPHONE ENCOUNTER
Please fax CT lung screen order to [facility]] on [address]. Fax: [phone].

## 2024-06-24 ENCOUNTER — TELEPHONE (OUTPATIENT)
Dept: CASE MANAGEMENT | Age: 74
End: 2024-06-24

## 2024-07-12 ENCOUNTER — HOSPITAL ENCOUNTER (OUTPATIENT)
Age: 74
Setting detail: OUTPATIENT SURGERY
Discharge: HOME | End: 2024-07-12
Attending: INTERNAL MEDICINE | Admitting: INTERNAL MEDICINE
Payer: MEDICARE

## 2024-07-12 ENCOUNTER — ANESTHESIA EVENT (OUTPATIENT)
Dept: ENDOSCOPY | Age: 74
End: 2024-07-12
Payer: MEDICARE

## 2024-07-12 ENCOUNTER — ANESTHESIA (OUTPATIENT)
Dept: ENDOSCOPY | Age: 74
End: 2024-07-12
Payer: MEDICARE

## 2024-07-12 VITALS
BODY MASS INDEX: 29.37 KG/M2 | HEART RATE: 78 BPM | HEIGHT: 64 IN | WEIGHT: 172 LBS | DIASTOLIC BLOOD PRESSURE: 76 MMHG | SYSTOLIC BLOOD PRESSURE: 124 MMHG | OXYGEN SATURATION: 98 % | RESPIRATION RATE: 18 BRPM | TEMPERATURE: 97.9 F

## 2024-07-12 DIAGNOSIS — Z86.010 HISTORY OF COLON POLYPS: ICD-10-CM

## 2024-07-12 DIAGNOSIS — K21.9 GASTROESOPHAGEAL REFLUX DISEASE WITHOUT ESOPHAGITIS: ICD-10-CM

## 2024-07-12 PROCEDURE — 3609010600 HC COLONOSCOPY POLYPECTOMY SNARE/COLD BIOPSY: Performed by: INTERNAL MEDICINE

## 2024-07-12 PROCEDURE — 2580000003 HC RX 258: Performed by: ANESTHESIOLOGY

## 2024-07-12 PROCEDURE — 6360000002 HC RX W HCPCS

## 2024-07-12 PROCEDURE — 7100000011 HC PHASE II RECOVERY - ADDTL 15 MIN: Performed by: INTERNAL MEDICINE

## 2024-07-12 PROCEDURE — 3609017100 HC EGD: Performed by: INTERNAL MEDICINE

## 2024-07-12 PROCEDURE — 3700000001 HC ADD 15 MINUTES (ANESTHESIA): Performed by: INTERNAL MEDICINE

## 2024-07-12 PROCEDURE — 88305 TISSUE EXAM BY PATHOLOGIST: CPT

## 2024-07-12 PROCEDURE — 2709999900 HC NON-CHARGEABLE SUPPLY: Performed by: INTERNAL MEDICINE

## 2024-07-12 PROCEDURE — 7100000010 HC PHASE II RECOVERY - FIRST 15 MIN: Performed by: INTERNAL MEDICINE

## 2024-07-12 PROCEDURE — 3700000000 HC ANESTHESIA ATTENDED CARE: Performed by: INTERNAL MEDICINE

## 2024-07-12 RX ORDER — LIDOCAINE HYDROCHLORIDE 20 MG/ML
INJECTION, SOLUTION INTRAVENOUS PRN
Status: DISCONTINUED | OUTPATIENT
Start: 2024-07-12 | End: 2024-07-12 | Stop reason: SDUPTHER

## 2024-07-12 RX ORDER — TRIAMTERENE AND HYDROCHLOROTHIAZIDE 37.5; 25 MG/1; MG/1
0.5 TABLET ORAL DAILY
COMMUNITY
Start: 2024-05-15

## 2024-07-12 RX ORDER — SODIUM CHLORIDE, SODIUM LACTATE, POTASSIUM CHLORIDE, CALCIUM CHLORIDE 600; 310; 30; 20 MG/100ML; MG/100ML; MG/100ML; MG/100ML
INJECTION, SOLUTION INTRAVENOUS CONTINUOUS
Status: DISCONTINUED | OUTPATIENT
Start: 2024-07-12 | End: 2024-07-12 | Stop reason: HOSPADM

## 2024-07-12 RX ORDER — PROPOFOL 10 MG/ML
INJECTION, EMULSION INTRAVENOUS PRN
Status: DISCONTINUED | OUTPATIENT
Start: 2024-07-12 | End: 2024-07-12 | Stop reason: SDUPTHER

## 2024-07-12 RX ORDER — GLYCOPYRROLATE 0.2 MG/ML
INJECTION INTRAMUSCULAR; INTRAVENOUS PRN
Status: DISCONTINUED | OUTPATIENT
Start: 2024-07-12 | End: 2024-07-12 | Stop reason: SDUPTHER

## 2024-07-12 RX ADMIN — PROPOFOL 40 MG: 10 INJECTION, EMULSION INTRAVENOUS at 09:38

## 2024-07-12 RX ADMIN — SODIUM CHLORIDE, POTASSIUM CHLORIDE, SODIUM LACTATE AND CALCIUM CHLORIDE: 600; 310; 30; 20 INJECTION, SOLUTION INTRAVENOUS at 08:59

## 2024-07-12 RX ADMIN — GLYCOPYRROLATE 0.2 MG: 0.2 INJECTION INTRAMUSCULAR; INTRAVENOUS at 09:32

## 2024-07-12 RX ADMIN — LIDOCAINE HYDROCHLORIDE 100 MG: 20 INJECTION, SOLUTION INTRAVENOUS at 09:32

## 2024-07-12 RX ADMIN — PROPOFOL 60 MG: 10 INJECTION, EMULSION INTRAVENOUS at 09:32

## 2024-07-12 RX ADMIN — PROPOFOL 100 MCG/KG/MIN: 10 INJECTION, EMULSION INTRAVENOUS at 09:33

## 2024-07-12 ASSESSMENT — PAIN - FUNCTIONAL ASSESSMENT: PAIN_FUNCTIONAL_ASSESSMENT: 0-10

## 2024-07-12 ASSESSMENT — PAIN SCALES - GENERAL
PAINLEVEL_OUTOF10: 0

## 2024-07-12 NOTE — PROCEDURES
EGD/COLONOSCOPY     Patient: Kathya Hamilton MRN: [id number]   YOB: 1950 Age: 74 y.o. Sex: female   Unit: [facility] ENDOSCOPY Room/Bed: Endo Pool/NONE Location: [facility]    Admitting Physician: MERISSA TALLEY     Primary Care Physician: Ferny Santana MD      DATE OF PROCEDURE: 7/12/2024  PROCEDURE: EGD/Colonoscopy    PREOPERATIVE DIAGNOSIS: History of colon polyps [Z86.010]  Gastroesophageal reflux disease without esophagitis [K21.9]  HPI: Diagnostic EGD for chronic cough that may be GERD versus LPR.  Colonoscopy for screening.  She has a history of idiopathic bronchiectasis, RA on MTX, HTN.  There is chronic cough.  She may have eosinophilic asthma and responds well to Dupixent and omeprazole 40 mg twice a day though is not sure which is helping.  We tried reducing her omeprazole to 20 mg once a day and she has noted not only no worsening but perhaps some improvement..  Her last colonoscopy was in 2018 with 1 polyp and a 5-year recall by Dr. Ventura    ENDOSCOPIST: MERISSA TALLEY MD    POSTOPERATIVE DIAGNOSIS:    -Esophagus unremarkable.  Some thick mucus was seen in the upper portion of the esophagus  -Stomach unremarkable.  -Duodenum unremarkable to the second portion  -Colonoscopy to cecum with 2 small cecal polyps and 2 small transverse colon polyps, all of which were removed and retrieved with a cold snare.  -Mild right and moderate left-sided diverticulosis.    I suspect that her chronic cough is on the basis of her pulmonary disease rather than reflux, but we still cannot prove this.    PLAN:   -We will follow-up with biopsy results and update the patient via the [employer] patient portal within 1-2 weeks.  Anticipate 3-year recall for next colonoscopy.  -Plan for 24-hour pH impedance test and esophageal manometry at [facility], perhaps in the next 4 to 6 weeks or when it can be arranged.  Recommend stopping omeprazole and all acid suppression 2 weeks  polyps consistent with fundic gland polyps and not biopsied.  The antrum was unremarkable.  In the duodenum, the first and second portions were unremarkable.    The cecum including the appendix and ileocecal valve were notable for 2 small cecal polyps, 3 to 4 mm, removed and retrieved with a cold snare.  The ascending colon was unremarkable.  In the transverse colon, there were 2 small polyps 3 to 4 mm, removed and retrieved with a cold snare.  There was moderate diverticulosis in the sigmoid and mild in the ascending colon.  The rectum was unremarkable.    Estimated Blood Loss:  Minimal  Complications: None    Signed By: MERISSA TALLEY MD

## 2024-07-12 NOTE — PROGRESS NOTES
Ambulatory Surgery/Procedure Discharge Note    Vitals:    07/12/24 1040   BP: 124/76   Pulse: 78   Resp: 18   Temp:    SpO2: 98%       In: 500 [I.V.:500]  Out: -  pt drank apple juice; voided X 1 in toilet    Restroom use offered before discharge.  Yes    Pain assessment:  none  Pain Level: 0    Pt returned to Endo recovery s/p colonoscopy and EGD.  By discharge, pt alert; speech clear; breathing easily on RA; denies any pain; drank apple juice and tolerated well.  Dr. Carballo called bossman Perry to update on findings.    Discharge instructions discussed with pt and bossman Perry, and both verbalized understanding.  IV removed, and dressing applied.  This RN assisted pt in dressing and assisted pt in bathroom.    Patient discharged to home/self care. Patient discharged via wheel chair by this RN to waiting family/S.O.       7/12/2024 11:07 AM

## 2024-07-12 NOTE — ANESTHESIA POSTPROCEDURE EVALUATION
Department of Anesthesiology  Postprocedure Note    Patient: Kathya Hamilton  MRN: [id number]  YOB: 1950  Date of evaluation: 7/12/2024    Procedure Summary       Date: 07/12/24 Room / Location: [place] [address] / [facility]    Anesthesia Start: 0927 Anesthesia Stop: 1010    Procedures:       COLONOSCOPY POLYPECTOMY SNARE/BIOPSY      ESOPHAGOGASTRODUODENOSCOPY Diagnosis:       History of colon polyps      Gastroesophageal reflux disease without esophagitis      (History of colon polyps [Z86.010])      (Gastroesophageal reflux disease without esophagitis [K21.9])    Surgeons: Norbert Carballo MD Responsible Provider: Gaurav Villa MD    Anesthesia Type: MAC ASA Status: 3            Anesthesia Type: No value filed.    Emilie Phase I: Emilie Score: 10    Emilie Phase II: Emilie Score: 10    Anesthesia Post Evaluation    Patient location during evaluation: PACU  Patient participation: complete - patient participated  Level of consciousness: awake  Pain score: 0  Airway patency: patent  Nausea & Vomiting: no nausea  Cardiovascular status: hemodynamically stable  Respiratory status: acceptable  Hydration status: stable  Pain management: adequate    No notable events documented.

## 2024-07-12 NOTE — DISCHARGE INSTRUCTIONS
ENDOSCOPY DISCHARGE INSTRUCTIONS:    Call the physician that did your procedure for any questions or concern:    [facility]: [phone]  DR. MERISSA TALLEY      ACTIVITY:    There are potential side effects to the medications used for sedation and anesthesia during your procedure.  These include:  Dizziness or light-headedness, confusion or memory loss, delayed reaction times, loss of coordination, nausea and vomiting.  Because of your increased risk for injury, we ask that you observe the following precautions:  For the next 24 hours,  DO NOT operate an automobile, bicycle, motorcycle, , power tools or large equipment of any kind.  Do not drink alcohol, sign any legal documents or make any legal decisions for 24 hours.  Do not bend your head over lower than your heart.  DO sit on the side of bed/couch awhile before getting up.  Plan on bedrest or quiet relaxation today.  You may resume normal activities in 24 hours.    DIET:    Your first meal today should be light, avoiding spicy and fatty foods.  If you tolerate this first meal, then you may advance to your regular diet unless otherwise advised by your physician.    NORMAL SYMPTOMS:  -Mild sore throat if you’ve had an EGD   -Gaseous discomfort    NOTIFY YOUR PHYSICIAN IF THESE SYMPTOMS OCCUR:  1. Fever (greater than 100)  5. Increased abdominal bloating  2. Severe pain    6. Excessive bleeding  3. Nausea and vomiting  7. Chest pain                                                                    4. Chills    8. Shortness of breath    ADDITIONAL INSTRUCTIONS:    Biopsy results: Call [facility] for biopsy results in 1 week    Educational Information:       PLAN:   -We will follow-up with biopsy results and update the patient via the [facility] patient portal within 1-2 weeks.  Anticipate 3-year recall for next colonoscopy.  -Plan for 24-hour pH impedance test and esophageal manometry at [facility], perhaps in the next 4 to 6 weeks or when  it can be arranged.  Recommend stopping omeprazole and all acid suppression 2 weeks before so that the study is done off of acid suppression medication.  She may resume omeprazole after the study if she feels it was helpful.  -High-fiber diet  -Follow-up in the office in about 3 months or sooner as needed.      Please review these discharge instructions this evening or tomorrow for  information you may have forgotten.            We want to thank you for choosing the [facility] as your health care provider. We always strive to provide you with excellent care while you are here. You may receive a survey in the mail regarding your care. We would appreciate you taking a few minutes of your time to complete this survey.

## 2024-07-12 NOTE — ANESTHESIA PRE PROCEDURE
Department of Anesthesiology  Preprocedure Note       Name:  Kathya Hamilton   Age:  74 y.o.  :  1950                                          MRN:  [id number]         Date:  2024      Surgeon: Surgeon(s):  Norbert Carballo MD    Procedure: Procedure(s):  COLONOSCOPY  ESOPHAGOGASTRODUODENOSCOPY    Medications prior to admission:   Prior to Admission medications    Medication Sig Start Date End Date Taking? Authorizing Provider   omeprazole (PRILOSEC) 40 MG delayed release capsule Take 1 capsule by mouth in the morning and at bedtime 24   Panchito Weiss MD   Spacer/Aero-Holding Chambers JOSELYN 1 Device by Does not apply route daily as needed (shortness of breath) 24   Panchito Weiss MD   Budeson-Glycopyrrol-Formoterol (BREZTRI AEROSPHERE) 160-9-4.8 MCG/ACT AERO Inhale 2 puffs into the lungs in the morning and at bedtime 24   Panchito Weiss MD   fluticasone (FLONASE) 50 MCG/ACT nasal spray 2 sprays by Each Nostril route daily 24   Panchito Weiss MD   azelastine (ASTELIN) 0.1 % nasal spray 2 sprays by Nasal route 2 times daily Use in each nostril as directed 24  Panchito Weiss MD   Budeson-Glycopyrrol-Formoterol (BREZTRI AEROSPHERE) 160-9-4.8 MCG/ACT AERO Inhale 2 puffs into the lungs 2 times daily Rinse mouth out after use. 23   Cookie Rey, APRN - CNP   omeprazole (PRILOSEC) 40 MG delayed release capsule Take 1 capsule by mouth in the morning and at bedtime 10/10/23   Panchito Weiss MD   fluticasone (FLONASE) 50 MCG/ACT nasal spray 2 sprays by Each Nostril route daily 23   Panchito Weiss MD   azelastine (ASTELIN) 0.1 % nasal spray 2 sprays by Nasal route 2 times daily Use in each nostril as directed 23   Panchito Weiss MD   montelukast (SINGULAIR) 10 MG tablet Take 1 tablet by mouth nightly 23  Cookie Rey, CHUYITA - CNP   albuterol sulfate HFA (PROVENTIL;VENTOLIN;PROAIR) 108 (90 Base) MCG/ACT inhaler Inhale 2 puffs into

## 2024-07-12 NOTE — H&P
Gastroenterology Outpatient History and Physical     Patient: Kathya Hamilton MRN: [id number] Sex: female   YOB: 1950 Age: 74 y.o. Location: [facility]    Date:7/12/2024  Primary Care Physician: Ferny Santana MD         Patient: Kathya Hamilton    Physician: MERISSA TALLEY MD    History of Present Illness: Diagnostic EGD for chronic cough that may be GERD versus LPR.  Colonoscopy for screening.  She has a history of idiopathic bronchiectasis, RA on MTX, HTN.  There is chronic cough.  She may have eosinophilic asthma and responds well to Dupixent and omeprazole 40 mg twice a day though is not sure which is helping.  We tried reducing her omeprazole to 20 mg once a day and she has noted not only no worsening but perhaps some improvement..  Her last colonoscopy was in 2018 with 1 polyp and a 5-year recall by Dr. Ventura  Review of Systems:  Weight Loss: No  Dysphagia: No  Dyspepsia: No  History:  Past Medical History:   Diagnosis Date    Asthma     Hyperlipidemia     Hypertension     Lung nodule     Rheumatoid arthritis (HCC)       Past Surgical History:   Procedure Laterality Date    JOINT REPLACEMENT Bilateral     bilateral knee replacement    MYRINGOTOMY W/ TUBES Right     ROTATOR CUFF REPAIR Right     SINUS SURGERY        Social History     Socioeconomic History    Marital status:      Spouse name: None    Number of children: None    Years of education: None    Highest education level: None   Tobacco Use    Smoking status: Never     Passive exposure: Never    Smokeless tobacco: Never   Vaping Use    Vaping Use: Never used   Substance and Sexual Activity    Alcohol use: Not Currently    Drug use: Never      History reviewed. No pertinent family history.  Allergies:   Allergies   Allergen Reactions    Erythromycin Nausea And Vomiting and Rash     Medications:   Prior to Admission medications    Medication Sig Start Date End Date Taking? Authorizing

## 2024-09-11 ENCOUNTER — HOSPITAL ENCOUNTER (OUTPATIENT)
Dept: ENDOSCOPY | Age: 74
Discharge: HOME | End: 2024-09-11
Payer: MEDICARE

## 2024-09-11 PROCEDURE — 3609015500 HC GASTRIC/DUODENAL MOTILITY &/OR MANOMETRY STUDY

## 2025-07-14 RX ORDER — ALBUTEROL SULFATE 90 UG/1
2 INHALANT RESPIRATORY (INHALATION) EVERY 6 HOURS PRN
Qty: 8.5 G | OUTPATIENT
Start: 2025-07-14

## (undated) DEVICE — TRAP SPEC RETRV CLR PLAS POLYP IN LN SUCT QUIK CTCH

## (undated) DEVICE — SNARE COLD DIAMOND 15MM THIN